# Patient Record
Sex: FEMALE | Race: BLACK OR AFRICAN AMERICAN | NOT HISPANIC OR LATINO | Employment: UNEMPLOYED | URBAN - METROPOLITAN AREA
[De-identification: names, ages, dates, MRNs, and addresses within clinical notes are randomized per-mention and may not be internally consistent; named-entity substitution may affect disease eponyms.]

---

## 2023-08-02 ENCOUNTER — APPOINTMENT (OUTPATIENT)
Dept: LAB | Facility: LAB | Age: 63
End: 2023-08-02
Payer: COMMERCIAL

## 2023-08-02 LAB
ALANINE AMINOTRANSFERASE (SGPT) (U/L) IN SER/PLAS: 19 U/L (ref 7–45)
ALBUMIN (G/DL) IN SER/PLAS: 4.2 G/DL (ref 3.4–5)
ALKALINE PHOSPHATASE (U/L) IN SER/PLAS: 47 U/L (ref 33–136)
ANION GAP IN SER/PLAS: 11 MMOL/L (ref 10–20)
ASPARTATE AMINOTRANSFERASE (SGOT) (U/L) IN SER/PLAS: 26 U/L (ref 9–39)
BILIRUBIN TOTAL (MG/DL) IN SER/PLAS: 0.4 MG/DL (ref 0–1.2)
CALCIUM (MG/DL) IN SER/PLAS: 9.8 MG/DL (ref 8.6–10.6)
CARBON DIOXIDE, TOTAL (MMOL/L) IN SER/PLAS: 30 MMOL/L (ref 21–32)
CHLORIDE (MMOL/L) IN SER/PLAS: 106 MMOL/L (ref 98–107)
CHOLESTEROL (MG/DL) IN SER/PLAS: 173 MG/DL (ref 0–199)
CHOLESTEROL IN HDL (MG/DL) IN SER/PLAS: 73.9 MG/DL
CHOLESTEROL/HDL RATIO: 2.3
CREATININE (MG/DL) IN SER/PLAS: 0.82 MG/DL (ref 0.5–1.05)
ERYTHROCYTE DISTRIBUTION WIDTH (RATIO) BY AUTOMATED COUNT: 15 % (ref 11.5–14.5)
ERYTHROCYTE MEAN CORPUSCULAR HEMOGLOBIN CONCENTRATION (G/DL) BY AUTOMATED: 30.7 G/DL (ref 32–36)
ERYTHROCYTE MEAN CORPUSCULAR VOLUME (FL) BY AUTOMATED COUNT: 91 FL (ref 80–100)
ERYTHROCYTES (10*6/UL) IN BLOOD BY AUTOMATED COUNT: 3.72 X10E12/L (ref 4–5.2)
ESTIMATED AVERAGE GLUCOSE FOR HBA1C: 111 MG/DL
GFR FEMALE: 80 ML/MIN/1.73M2
GLUCOSE (MG/DL) IN SER/PLAS: 96 MG/DL (ref 74–99)
HEMATOCRIT (%) IN BLOOD BY AUTOMATED COUNT: 33.9 % (ref 36–46)
HEMOGLOBIN (G/DL) IN BLOOD: 10.4 G/DL (ref 12–16)
HEMOGLOBIN A1C/HEMOGLOBIN TOTAL IN BLOOD: 5.5 %
LDL: 88 MG/DL (ref 0–99)
LEUKOCYTES (10*3/UL) IN BLOOD BY AUTOMATED COUNT: 3.6 X10E9/L (ref 4.4–11.3)
NRBC (PER 100 WBCS) BY AUTOMATED COUNT: 0 /100 WBC (ref 0–0)
PLATELETS (10*3/UL) IN BLOOD AUTOMATED COUNT: 275 X10E9/L (ref 150–450)
POTASSIUM (MMOL/L) IN SER/PLAS: 4.1 MMOL/L (ref 3.5–5.3)
PROTEIN TOTAL: 6.8 G/DL (ref 6.4–8.2)
SODIUM (MMOL/L) IN SER/PLAS: 143 MMOL/L (ref 136–145)
TRIGLYCERIDE (MG/DL) IN SER/PLAS: 56 MG/DL (ref 0–149)
UREA NITROGEN (MG/DL) IN SER/PLAS: 18 MG/DL (ref 6–23)
VLDL: 11 MG/DL (ref 0–40)

## 2023-08-15 PROBLEM — H04.123 DRY EYE SYNDROME OF LACRIMAL GLAND, BILATERAL: Status: ACTIVE | Noted: 2023-08-15

## 2023-08-15 PROBLEM — S69.90XA INJURY OF LITTLE FINGER: Status: ACTIVE | Noted: 2023-08-15

## 2023-08-15 PROBLEM — H02.88B MEIBOMIAN GLAND DYSFUNCTION (MGD) OF UPPER AND LOWER LIDS OF BOTH EYES: Status: ACTIVE | Noted: 2017-02-15

## 2023-08-15 PROBLEM — E55.9 VITAMIN D DEFICIENCY: Status: ACTIVE | Noted: 2023-05-22

## 2023-08-15 PROBLEM — R07.89 ATYPICAL CHEST PAIN: Status: ACTIVE | Noted: 2023-08-15

## 2023-08-15 PROBLEM — I50.30: Status: ACTIVE | Noted: 2023-08-15

## 2023-08-15 PROBLEM — J34.3 HYPERTROPHY OF NASAL TURBINATES: Status: ACTIVE | Noted: 2023-08-15

## 2023-08-15 PROBLEM — I82.4Y1: Status: ACTIVE | Noted: 2022-12-29

## 2023-08-15 PROBLEM — H27.03: Status: ACTIVE | Noted: 2017-02-15

## 2023-08-15 PROBLEM — M25.511 PAIN IN JOINT OF RIGHT SHOULDER: Status: ACTIVE | Noted: 2023-08-15

## 2023-08-15 PROBLEM — J45.909 ASTHMA (HHS-HCC): Status: ACTIVE | Noted: 2023-08-15

## 2023-08-15 PROBLEM — K92.2 GI BLEED: Status: ACTIVE | Noted: 2023-06-10

## 2023-08-15 PROBLEM — M67.911 DYSFUNCTION OF RIGHT ROTATOR CUFF: Status: ACTIVE | Noted: 2023-08-15

## 2023-08-15 PROBLEM — I70.209 ATHEROSCLEROSIS OF ARTERIES OF EXTREMITIES (CMS-HCC): Status: ACTIVE | Noted: 2023-08-15

## 2023-08-15 PROBLEM — I10 HYPERTENSION: Status: ACTIVE | Noted: 2023-05-22

## 2023-08-15 PROBLEM — J31.0 CHRONIC RHINITIS: Status: ACTIVE | Noted: 2023-08-15

## 2023-08-15 PROBLEM — E78.00 HYPERCHOLESTEROLEMIA: Status: ACTIVE | Noted: 2023-08-15

## 2023-08-15 PROBLEM — N95.0 POST-MENOPAUSAL BLEEDING: Status: ACTIVE | Noted: 2023-08-15

## 2023-08-15 PROBLEM — M81.0 OSTEOPOROSIS WITHOUT CURRENT PATHOLOGICAL FRACTURE: Status: ACTIVE | Noted: 2023-05-22

## 2023-08-15 PROBLEM — R00.2 PALPITATIONS: Status: ACTIVE | Noted: 2023-08-15

## 2023-08-15 PROBLEM — M79.89 LEG SWELLING: Status: ACTIVE | Noted: 2023-08-15

## 2023-08-15 PROBLEM — K62.5 RECTAL BLEEDING: Status: ACTIVE | Noted: 2023-06-10

## 2023-08-15 PROBLEM — F33.42 RECURRENT MAJOR DEPRESSIVE DISORDER, IN FULL REMISSION (CMS-HCC): Status: ACTIVE | Noted: 2023-05-22

## 2023-08-15 PROBLEM — H17.9 CORNEAL SCARS, BOTH EYES: Status: ACTIVE | Noted: 2020-08-13

## 2023-08-15 PROBLEM — M19.90 ARTHRITIS: Status: ACTIVE | Noted: 2023-08-15

## 2023-08-15 PROBLEM — H40.9 GLAUCOMA: Status: ACTIVE | Noted: 2020-08-13

## 2023-08-15 PROBLEM — R05.3 CHRONIC COUGH: Status: ACTIVE | Noted: 2023-08-15

## 2023-08-15 PROBLEM — R00.1 SINUS BRADYCARDIA: Status: ACTIVE | Noted: 2023-08-15

## 2023-08-15 PROBLEM — H40.10X0: Status: ACTIVE | Noted: 2017-02-15

## 2023-08-15 PROBLEM — H02.88A MEIBOMIAN GLAND DYSFUNCTION (MGD) OF UPPER AND LOWER LIDS OF BOTH EYES: Status: ACTIVE | Noted: 2017-02-15

## 2023-08-15 PROBLEM — H27.03 APHAKIA, BILATERAL: Status: ACTIVE | Noted: 2020-08-13

## 2023-08-15 PROBLEM — J38.4 VOCAL CORD EDEMA: Status: ACTIVE | Noted: 2023-08-15

## 2023-08-15 RX ORDER — FLUTICASONE PROPIONATE 50 MCG
1 SPRAY, SUSPENSION (ML) NASAL 2 TIMES DAILY
COMMUNITY
Start: 2016-08-19

## 2023-08-15 RX ORDER — LORATADINE 10 MG/1
10 TABLET ORAL NIGHTLY
COMMUNITY
Start: 2019-02-05

## 2023-08-15 RX ORDER — ESTRADIOL 0.1 MG/G
1 CREAM VAGINAL
COMMUNITY
Start: 2018-02-05

## 2023-08-15 RX ORDER — IBUPROFEN 400 MG/1
TABLET ORAL
COMMUNITY
Start: 2020-09-15

## 2023-08-15 RX ORDER — VIT C/E/ZN/COPPR/LUTEIN/ZEAXAN 250MG-90MG
25 CAPSULE ORAL EVERY MORNING
Qty: 30 CAPSULE | Refills: 11 | COMMUNITY
Start: 2023-07-24 | End: 2024-07-23

## 2023-08-15 RX ORDER — AMLODIPINE BESYLATE 2.5 MG/1
TABLET ORAL
COMMUNITY
Start: 2020-08-18

## 2023-08-15 RX ORDER — ERGOCALCIFEROL 1.25 MG/1
1 CAPSULE ORAL
COMMUNITY
Start: 2018-05-16

## 2023-08-15 RX ORDER — BRIMONIDINE TARTRATE 2 MG/ML
1 SOLUTION/ DROPS OPHTHALMIC
COMMUNITY
Start: 2023-04-07

## 2023-08-15 RX ORDER — DOCUSATE SODIUM 100 MG/1
CAPSULE, LIQUID FILLED ORAL
COMMUNITY
Start: 2015-09-28

## 2023-08-15 RX ORDER — MINERAL OIL, PETROLATUM 425; 573 MG/G; MG/G
OINTMENT OPHTHALMIC
COMMUNITY
Start: 2023-06-08

## 2023-08-15 RX ORDER — HYDROCHLOROTHIAZIDE 25 MG/1
25 TABLET ORAL
COMMUNITY
Start: 2019-03-19

## 2023-08-15 RX ORDER — DICYCLOMINE HYDROCHLORIDE 10 MG/1
1 CAPSULE ORAL 2 TIMES DAILY PRN
COMMUNITY
Start: 2019-03-12

## 2023-08-15 RX ORDER — POLYETHYLENE GLYCOL 3350 17 G/17G
POWDER, FOR SOLUTION ORAL
COMMUNITY
Start: 2015-10-30

## 2023-08-15 RX ORDER — APIXABAN 5 MG (74)
KIT ORAL
COMMUNITY
Start: 2022-12-30

## 2023-08-15 RX ORDER — OXYCODONE AND ACETAMINOPHEN 5; 325 MG/1; MG/1
TABLET ORAL
COMMUNITY
Start: 2022-01-10

## 2023-08-15 RX ORDER — CYCLOBENZAPRINE HCL 10 MG
10 TABLET ORAL NIGHTLY
COMMUNITY
Start: 2020-10-05

## 2023-08-15 RX ORDER — PANTOPRAZOLE SODIUM 20 MG/1
TABLET, DELAYED RELEASE ORAL
COMMUNITY
Start: 2020-08-11

## 2023-08-15 RX ORDER — DICYCLOMINE HYDROCHLORIDE 20 MG/1
1 TABLET ORAL 4 TIMES DAILY PRN
COMMUNITY
Start: 2020-05-01

## 2023-08-15 RX ORDER — METHOCARBAMOL 500 MG/1
TABLET, FILM COATED ORAL
COMMUNITY
Start: 2020-02-18

## 2023-08-15 RX ORDER — MULTIVITAMIN WITH FOLIC ACID 400 MCG
1 TABLET ORAL DAILY
COMMUNITY

## 2023-08-15 RX ORDER — ACYCLOVIR 400 MG/1
400 TABLET ORAL
COMMUNITY
Start: 2019-05-23

## 2023-08-15 RX ORDER — DICLOFENAC SODIUM 75 MG/1
75 TABLET, DELAYED RELEASE ORAL 2 TIMES DAILY PRN
COMMUNITY
Start: 2020-02-24

## 2023-08-15 RX ORDER — LOSARTAN POTASSIUM 25 MG/1
TABLET ORAL
COMMUNITY
Start: 2020-09-15

## 2023-08-15 RX ORDER — HYPROMELLOSE 2910 5 MG/ML
1 SOLUTION OPHTHALMIC
COMMUNITY
Start: 2023-06-07

## 2023-08-15 RX ORDER — NAPROXEN 500 MG/1
1 TABLET ORAL 2 TIMES DAILY PRN
COMMUNITY
Start: 2019-10-22

## 2023-08-15 RX ORDER — KETOROLAC TROMETHAMINE 10 MG/1
1 TABLET, FILM COATED ORAL 2 TIMES DAILY PRN
COMMUNITY
Start: 2021-07-15

## 2023-08-15 RX ORDER — BENZONATATE 100 MG/1
1 CAPSULE ORAL 3 TIMES DAILY PRN
COMMUNITY
Start: 2018-05-16

## 2023-08-15 RX ORDER — CHLORHEXIDINE GLUCONATE ORAL RINSE 1.2 MG/ML
15 SOLUTION DENTAL 2 TIMES DAILY
COMMUNITY
Start: 2015-11-30

## 2023-08-15 RX ORDER — TIMOLOL MALEATE 2.5 MG/ML
1 SOLUTION OPHTHALMIC
COMMUNITY
Start: 2023-06-07

## 2023-08-15 RX ORDER — ATORVASTATIN CALCIUM 40 MG/1
40 TABLET, FILM COATED ORAL
COMMUNITY
Start: 2019-07-29

## 2023-08-15 RX ORDER — CALCIUM CARBONATE 600 MG
1500 TABLET ORAL EVERY MORNING
Qty: 75 TABLET | Refills: 11 | COMMUNITY
Start: 2023-07-24 | End: 2024-07-23

## 2023-08-15 RX ORDER — CLINDAMYCIN PHOSPHATE 10 UG/ML
LOTION TOPICAL
COMMUNITY
Start: 2019-04-29

## 2023-08-15 RX ORDER — DICLOFENAC SODIUM 50 MG/1
1 TABLET, DELAYED RELEASE ORAL
COMMUNITY
Start: 2020-07-27

## 2023-08-15 RX ORDER — CHOLECALCIFEROL (VITAMIN D3) 25 MCG
1000 TABLET ORAL
COMMUNITY
Start: 2020-09-15

## 2023-08-15 RX ORDER — NAPROXEN SODIUM 220 MG/1
81 TABLET, FILM COATED ORAL AS NEEDED
COMMUNITY

## 2023-08-15 RX ORDER — LIDOCAINE HYDROCHLORIDE AND HYDROCORTISONE ACETATE 30; 5 MG/G; MG/G
CREAM TOPICAL EVERY 8 HOURS PRN
COMMUNITY
Start: 2022-11-23

## 2023-08-15 RX ORDER — ACETAMINOPHEN 500 MG
500 TABLET ORAL EVERY 8 HOURS PRN
COMMUNITY
Start: 2020-03-30

## 2023-08-15 RX ORDER — MUPIROCIN 20 MG/G
OINTMENT TOPICAL
COMMUNITY
Start: 2020-02-06

## 2023-08-15 RX ORDER — GUAIFENESIN 100 MG/5ML
100 SOLUTION ORAL EVERY 4 HOURS PRN
COMMUNITY
Start: 2018-12-18

## 2023-08-15 RX ORDER — OMEPRAZOLE 20 MG/1
20 CAPSULE, DELAYED RELEASE ORAL
COMMUNITY
Start: 2015-09-28

## 2023-08-15 RX ORDER — TIZANIDINE 4 MG/1
4 TABLET ORAL 2 TIMES DAILY
COMMUNITY
Start: 2020-07-27

## 2023-08-15 RX ORDER — ACETAMINOPHEN 325 MG/1
325 TABLET ORAL EVERY 6 HOURS PRN
COMMUNITY
Start: 2020-11-16

## 2023-08-15 RX ORDER — LUBIPROSTONE 8 UG/1
1 CAPSULE ORAL
COMMUNITY
Start: 2018-05-16

## 2023-08-15 RX ORDER — TRIAMCINOLONE ACETONIDE 1 MG/G
OINTMENT TOPICAL 2 TIMES DAILY
COMMUNITY
Start: 2019-02-05

## 2023-08-15 RX ORDER — GABAPENTIN 100 MG/1
CAPSULE ORAL
COMMUNITY
Start: 2021-02-24

## 2023-08-15 RX ORDER — CYCLOBENZAPRINE HCL 5 MG
TABLET ORAL
COMMUNITY
Start: 2023-07-24

## 2023-08-15 RX ORDER — DULOXETIN HYDROCHLORIDE 30 MG/1
CAPSULE, DELAYED RELEASE ORAL
COMMUNITY
Start: 2020-05-05

## 2023-08-15 RX ORDER — ORPHENADRINE CITRATE 100 MG/1
TABLET, EXTENDED RELEASE ORAL
COMMUNITY
Start: 2022-04-01

## 2023-08-15 RX ORDER — OLOPATADINE HYDROCHLORIDE 1 MG/ML
1 SOLUTION/ DROPS OPHTHALMIC 2 TIMES DAILY
COMMUNITY
Start: 2023-06-07

## 2023-08-15 RX ORDER — ALBUTEROL SULFATE 0.83 MG/ML
SOLUTION RESPIRATORY (INHALATION)
COMMUNITY
Start: 2021-11-08

## 2023-08-15 RX ORDER — FEXOFENADINE HCL 60 MG
60 TABLET ORAL
COMMUNITY
Start: 2019-04-04

## 2023-08-15 RX ORDER — FAMOTIDINE 20 MG/1
TABLET, FILM COATED ORAL
COMMUNITY
Start: 2022-08-27

## 2023-08-15 RX ORDER — MELOXICAM 15 MG/1
1 TABLET ORAL DAILY PRN
COMMUNITY
Start: 2019-03-19

## 2023-08-15 RX ORDER — IPRATROPIUM BROMIDE 17 UG/1
2 AEROSOL, METERED RESPIRATORY (INHALATION) 4 TIMES DAILY
COMMUNITY
Start: 2019-10-28

## 2023-08-15 RX ORDER — POLYETHYLENE GLYCOL 3350, SODIUM SULFATE ANHYDROUS, SODIUM BICARBONATE, SODIUM CHLORIDE, POTASSIUM CHLORIDE 236; 22.74; 6.74; 5.86; 2.97 G/4L; G/4L; G/4L; G/4L; G/4L
POWDER, FOR SOLUTION ORAL
COMMUNITY
Start: 2023-06-11

## 2023-08-15 RX ORDER — PREDNISOLONE ACETATE 10 MG/ML
SUSPENSION/ DROPS OPHTHALMIC
COMMUNITY
Start: 2023-06-07

## 2023-08-15 RX ORDER — CYCLOSPORINE 0.5 MG/ML
EMULSION OPHTHALMIC
COMMUNITY
Start: 2015-02-16

## 2023-08-15 RX ORDER — SULFACETAMIDE SODIUM, SULFUR 98; 48 MG/G; MG/G
LIQUID TOPICAL
COMMUNITY
Start: 2019-08-16

## 2023-08-15 RX ORDER — MEDROXYPROGESTERONE ACETATE 10 MG/1
TABLET ORAL
COMMUNITY
Start: 2022-09-28

## 2023-08-15 RX ORDER — BACITRACIN 500 [USP'U]/G
OINTMENT TOPICAL
COMMUNITY
Start: 2020-08-20

## 2023-08-15 RX ORDER — IBUPROFEN 600 MG/1
600 TABLET ORAL 2 TIMES DAILY PRN
COMMUNITY
Start: 2019-10-16

## 2023-08-15 RX ORDER — CALCIUM CARBONATE/VITAMIN D3 600MG-5MCG
1 TABLET ORAL
COMMUNITY
Start: 2019-03-10

## 2023-08-15 RX ORDER — SIMVASTATIN 40 MG/1
40 TABLET, FILM COATED ORAL
Qty: 30 TABLET | Refills: 98 | COMMUNITY
Start: 2016-06-24 | End: 2024-07-23

## 2023-08-15 RX ORDER — HYDROCORTISONE 2.5% 25 MG/G
CREAM TOPICAL
COMMUNITY
Start: 2023-01-04

## 2023-08-15 RX ORDER — LISINOPRIL 10 MG/1
10 TABLET ORAL EVERY MORNING
Qty: 30 TABLET | Refills: 7 | COMMUNITY
Start: 2023-01-09 | End: 2023-08-20

## 2023-08-15 RX ORDER — HYDROCORTISONE 25 MG/G
OINTMENT TOPICAL 2 TIMES DAILY
COMMUNITY
Start: 2020-11-20

## 2023-08-15 RX ORDER — CLOTRIMAZOLE 1 %
CREAM (GRAM) TOPICAL
COMMUNITY
Start: 2020-03-24

## 2023-08-15 RX ORDER — OMEPRAZOLE 40 MG/1
40 CAPSULE, DELAYED RELEASE ORAL
COMMUNITY
Start: 2020-04-26 | End: 2024-07-23

## 2023-08-15 RX ORDER — IBUPROFEN 800 MG/1
TABLET ORAL
COMMUNITY
Start: 2020-03-30

## 2023-08-15 RX ORDER — METRONIDAZOLE 7.5 MG/G
LOTION TOPICAL 2 TIMES DAILY
COMMUNITY
Start: 2019-08-16

## 2023-08-15 RX ORDER — BACLOFEN 10 MG/1
TABLET ORAL
COMMUNITY
Start: 2020-05-05

## 2023-08-15 RX ORDER — FLUOXETINE 10 MG/1
10 CAPSULE ORAL EVERY MORNING
Qty: 30 CAPSULE | Refills: 18 | COMMUNITY
Start: 2023-01-10 | End: 2024-07-23

## 2023-08-15 RX ORDER — MINERAL OIL
180 ENEMA (ML) RECTAL
COMMUNITY
Start: 2020-01-22

## 2023-08-15 RX ORDER — AMLODIPINE BESYLATE 5 MG/1
5 TABLET ORAL
COMMUNITY
Start: 2018-04-18

## 2023-08-15 RX ORDER — IBUPROFEN 200 MG
TABLET ORAL
COMMUNITY
Start: 2020-02-18

## 2023-08-15 RX ORDER — TIMOLOL MALEATE 5 MG/ML
1 SOLUTION/ DROPS OPHTHALMIC EVERY MORNING
COMMUNITY
Start: 2023-04-17

## 2023-08-15 RX ORDER — ASPIRIN 81 MG/1
81 TABLET ORAL
COMMUNITY

## 2023-08-15 RX ORDER — TRIAMCINOLONE ACETONIDE 1 MG/G
CREAM TOPICAL 2 TIMES DAILY
COMMUNITY
Start: 2023-07-26

## 2023-08-15 RX ORDER — MULTIVITAMIN
TABLET ORAL
COMMUNITY
Start: 2023-07-24

## 2023-08-15 RX ORDER — MAG HYDROX/ALUMINUM HYD/SIMETH 200-200-20
1 SUSPENSION, ORAL (FINAL DOSE FORM) ORAL 2 TIMES DAILY
COMMUNITY
Start: 2022-06-06

## 2023-08-15 RX ORDER — PANTOPRAZOLE SODIUM 40 MG/1
1 TABLET, DELAYED RELEASE ORAL DAILY
COMMUNITY
Start: 2022-03-10

## 2023-08-15 RX ORDER — FLUOROMETHOLONE 1 MG/ML
SUSPENSION/ DROPS OPHTHALMIC
COMMUNITY
Start: 2023-06-07

## 2023-08-15 RX ORDER — FLUOXETINE HYDROCHLORIDE 40 MG/1
CAPSULE ORAL
COMMUNITY
Start: 2022-10-20

## 2023-08-15 RX ORDER — FLUOXETINE HYDROCHLORIDE 20 MG/1
20 CAPSULE ORAL
COMMUNITY
Start: 2018-03-28

## 2023-08-15 RX ORDER — IPRATROPIUM BROMIDE 42 UG/1
2 SPRAY, METERED NASAL
COMMUNITY
Start: 2017-01-06

## 2023-08-15 RX ORDER — SIMVASTATIN 20 MG/1
TABLET, FILM COATED ORAL
COMMUNITY
Start: 2022-11-07

## 2023-08-15 RX ORDER — ALBUTEROL SULFATE 90 UG/1
2 AEROSOL, METERED RESPIRATORY (INHALATION) EVERY 6 HOURS PRN
COMMUNITY
Start: 2018-04-04

## 2023-08-15 RX ORDER — CALCIUM CARBONATE 500(1250)
1 TABLET ORAL DAILY
COMMUNITY
Start: 2016-06-14

## 2023-08-15 RX ORDER — NALOXONE HYDROCHLORIDE 4 MG/.1ML
SPRAY NASAL
COMMUNITY

## 2023-08-15 RX ORDER — KETOCONAZOLE 20 MG/ML
SHAMPOO, SUSPENSION TOPICAL
COMMUNITY
Start: 2019-02-26

## 2023-08-15 RX ORDER — BIMATOPROST 0.1 MG/ML
1 SOLUTION/ DROPS OPHTHALMIC
COMMUNITY
Start: 2020-08-21

## 2023-08-15 RX ORDER — PREDNISOLONE/GATIFLOX/NEPAFEN 1-0.5-0.1%
1 SUSPENSION, DROPS(FINAL DOSAGE FORM)(ML) OPHTHALMIC (EYE) AS NEEDED
COMMUNITY

## 2023-08-22 PROBLEM — R10.9 ABDOMINAL CRAMPING: Status: ACTIVE | Noted: 2023-08-22

## 2023-08-22 PROBLEM — Z86.718 HISTORY OF DVT (DEEP VEIN THROMBOSIS): Status: ACTIVE | Noted: 2023-08-22

## 2023-08-22 PROBLEM — R49.0 HOARSENESS: Status: ACTIVE | Noted: 2023-08-22

## 2023-08-26 PROBLEM — S93.409A ANKLE SPRAIN: Status: ACTIVE | Noted: 2023-08-26

## 2023-08-26 PROBLEM — I10 ACCELERATED ESSENTIAL HYPERTENSION: Status: ACTIVE | Noted: 2021-04-29

## 2023-08-26 PROBLEM — I83.90 VARICOSE VEIN OF LEG: Status: ACTIVE | Noted: 2023-08-26

## 2023-10-02 ENCOUNTER — CLINICAL SUPPORT (OUTPATIENT)
Dept: AUDIOLOGY | Facility: HOSPITAL | Age: 63
End: 2023-10-02
Payer: COMMERCIAL

## 2023-10-02 DIAGNOSIS — H93.13 TINNITUS, BILATERAL: Primary | ICD-10-CM

## 2023-10-02 PROCEDURE — 92550 TYMPANOMETRY & REFLEX THRESH: CPT

## 2023-10-02 PROCEDURE — 92557 COMPREHENSIVE HEARING TEST: CPT

## 2023-10-02 NOTE — PROGRESS NOTES
"  AUDIOMETRIC EVALUATION    Name:  Fatimah Bullard  :  1960  Age:  63 y.o.  Date of Evaluation:  2023    HISTORY:  Reason for visit: Ms. Bullard is seen today for an evaluation of hearing.     Patient reports constant bilateral tinnitus and having a \"sensitive\" right ear. She denied hearing concerns, dizziness, aural fullness, recent ear infections, otalgia, otorrhea, history of otologic surgeries or history of loud noise exposure.    EVALUATION:    See Audiogram and Immittance results under \"Media\".    RESULTS:     Otoscopic Evaluation:     RIGHT  External ear exam: Normal pinna  Internal ear exam: normal TM and external ear canal right ear     LEFT  External ear exam: Normal pinna  Internal ear exam: normal TM and external ear canal left ear     Immittance:   Immittance Measures: 226 Hz          Right Ear: Type A: Middle ear pressure and eardrm mobility within defined limits         Left Ear:  Type A: Middle ear pressure and eardrm mobility within defined limits    Reflexes and Reflex Decay:    Ipsilateral Reflexes (500-4000 Hz):          Probe/Stimulus Right Ear: present       Probe/Stimulus Left Ear: present    Contralateral Reflexes (500-4000 Hz):      Probe Right Ear (Stimulus Left Ear):  not tested  Probe Left Ear (Stimulus Right Ear):  not tested            Acoustic Reflex Decay (contralateral at 500 and 1000 Hz):       Probe Right Ear (Stimulus Left Ear): not tested  Probe Left Ear (Stimulus Right Ear): not tested    Otoacoustic Emissions [DP(OAEs)]:  Right Ear: Some DPOAEs present: The presence of significant otoacoustic emissions (greater than or equal to 8 dB DP-NF) at some frequencies, while absent at other frequencies when middle ear status is normal suggests abnormal outer hair cell function for only portions of the cochlea. This may be consistent with at least a mild hearing loss at those frequencies where the emissions are absent. 750-8000 Hz  Left Ear: Some DPOAEs present: The " presence of significant otoacoustic emissions (greater than or equal to 8 dB DP-NF) at some frequencies, while absent at other frequencies when middle ear status is normal suggests abnormal outer hair cell function for only portions of the cochlea. This may be consistent with at least a mild hearing loss at those frequencies where the emissions are absent. 750-8000 Hz         Audiometry:  Test Technique: Standard Audiometry under insert phones.    Reliability: good     Pure Tone Audiometry:    Right: Normal hearing sensitivity 125-8000 Hz.    Left: Normal hearing sensitivity 125-8000 Hz.      Speech Audiometry (via recorded, 25-words unless noted; M=masked):       Right Ear: Speech Reception Threshold (SRT) was obtained at 5 dBHL  Word Recognition Scores were Excellent (100%) in quiet when words were presented at 45 dBHL, using the NU-6 3A word list.  Left Ear: Speech Reception Threshold (SRT) was obtained at 5 dBHL  Word Recognition Scores were Excellent (100%) in quiet when words were presented at 45 dBHL, using the NU-6 4A word list.     IMPRESSIONS:  Today's test results suggest normal middle ear functioning with normal hearing sensitivity and excellent word understanding in both ears.     PATIENT EDUCATION:   Ms. Bullard was counseled with regard to the findings.       PLAN:  - Follow up with primary care physician as directed.  - Follow up with Dr. Efe Gutierrez (referring physician) as directed.  - Re-test hearing if concerns arise, or annually to monitor.     Time: 13:12-13:32    TRISTAN Gutierrez, CCC-A  Clinical Audiologist      Degree of   Hearing Sensitivity dB Range   Within Normal Limits (WNL) 0 - 20   Slight 25   Mild 26 - 40   Moderate 41 - 55   Moderately-Severe 56 - 70   Severe 71 - 90   Profound 91 +     KEY  TM Tympanic Membrane   WNL Within Normal Limits   HA Hearing Aid   SNHL Sensorineural Hearing Loss   CHL Conductive Hearing Loss   NIHL Noise-Induced Hearing Loss   ECV Ear Canal Volume

## 2023-10-23 ENCOUNTER — OFFICE VISIT (OUTPATIENT)
Dept: OTOLARYNGOLOGY | Facility: HOSPITAL | Age: 63
End: 2023-10-23
Payer: COMMERCIAL

## 2023-10-23 VITALS — WEIGHT: 124.5 LBS | TEMPERATURE: 97.2 F | BODY MASS INDEX: 22.05 KG/M2

## 2023-10-23 DIAGNOSIS — R49.0 HOARSENESS: ICD-10-CM

## 2023-10-23 DIAGNOSIS — J38.4 VOCAL CORD EDEMA: ICD-10-CM

## 2023-10-23 DIAGNOSIS — J38.1 REINKE'S EDEMA OF VOCAL FOLDS: Primary | ICD-10-CM

## 2023-10-23 DIAGNOSIS — J31.0 CHRONIC RHINITIS: ICD-10-CM

## 2023-10-23 DIAGNOSIS — R49.0 DYSPHONIA: ICD-10-CM

## 2023-10-23 PROCEDURE — 3078F DIAST BP <80 MM HG: CPT | Performed by: OTOLARYNGOLOGY

## 2023-10-23 PROCEDURE — 99214 OFFICE O/P EST MOD 30 MIN: CPT | Performed by: OTOLARYNGOLOGY

## 2023-10-23 PROCEDURE — 31579 LARYNGOSCOPY TELESCOPIC: CPT | Performed by: OTOLARYNGOLOGY

## 2023-10-23 PROCEDURE — 3077F SYST BP >= 140 MM HG: CPT | Performed by: OTOLARYNGOLOGY

## 2023-10-23 PROCEDURE — 31575 DIAGNOSTIC LARYNGOSCOPY: CPT | Performed by: OTOLARYNGOLOGY

## 2023-10-23 PROCEDURE — 3008F BODY MASS INDEX DOCD: CPT | Performed by: OTOLARYNGOLOGY

## 2023-10-23 PROCEDURE — 1036F TOBACCO NON-USER: CPT | Performed by: OTOLARYNGOLOGY

## 2023-10-23 ASSESSMENT — ENCOUNTER SYMPTOMS
OCCASIONAL FEELINGS OF UNSTEADINESS: 0
LOSS OF SENSATION IN FEET: 0

## 2023-10-23 NOTE — PROGRESS NOTES
ASSESSMENT AND PLAN:   Fatimah Bullard is a 63 y.o. 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  She has chronic voice changes but does not wish to pursue any interventions.  We offered endoscopic procedures in the office as well as in the OR.    Today's examination to include stroboscopy/ flexible laryngoscopy demonstrates nodular Bryan's Edema.    We discussed 6-month follow-up versus procedures in the clinic or the OR to remove the nodular changes and improve voice quality.    I would like to see the patient back in 6 months.       Reason For Consult  No chief complaint on file.       HISTORY OF PRESENT ILLNESS:  Fatimah Bullard is a 63 y.o. female presenting for a follow up visit with me for voice changes.  The patient reports no change in voice.  Recently had an audiogram which was normal    Prior History:   Last seen on 04/24/2023 with a history of voice changes that have been on going for several years. SHe was seen at [facility] and noted to have polypoid changes and declined intervention. She was seen by Dr. Beaver for allergies. Today, she has PND with a rough quality to her voice     Exam demonstrated bilateral Bryan's edema with mid cord fullness bilaterally that doesn't appear ominous on examination. There is stiffening of mid cord. She is breathing well without concerns and has dysphagia that are age related     She wished to have no interventions performed despite likely improvement.     Past Medical History  She has a past medical history of Other specified abnormal findings of blood chemistry, Personal history of diseases of the blood and blood-forming organs and certain disorders involving the immune mechanism, Personal history of other diseases of the circulatory system, Personal history of other diseases of the digestive system, Personal history of other diseases of the musculoskeletal system and connective tissue, Personal history of other diseases of the  nervous system and sense organs, Personal history of other diseases of the nervous system and sense organs (10/02/2015), Personal history of other diseases of the respiratory system, Personal history of other endocrine, nutritional and metabolic disease, Unspecified abdominal hernia without obstruction or gangrene, and Unspecified abdominal pain (08/27/2021). Surgical History  She has a past surgical history that includes Tonsillectomy (01/10/2014).   Social History  She has no history on file for tobacco use, alcohol use, and drug use. Allergies  Iodinated contrast media, Penicillins, Prednisone, and Mold     Family History  Family History   Problem Relation Name Age of Onset    Diabetes Other Family hx     Hypertension Other Family hx        Review of Systems  All 10 systems were reviewed and negative except for above.      Last Recorded Vitals  There were no vitals taken for this visit.    Physical Exam  ENT Physical Exam  Constitutional  Appearance: patient appears well-developed and well-nourished,  Head and Face  Appearance: head appears normal and face appears normal;  Ear  Auricles: right auricle normal; left auricle normal;  Nose  External Nose: nares patent bilaterally;  Oral Cavity/Oropharynx  Lips: normal;  Neck  Neck: neck normal; neck palpation normal;  Respiratory  Inspection: no retractions visible;  Cardiovascular  Inspection: no peripheral edema present;  Neurovestibular  Mental Status: alert and oriented;  Psychiatric: mood normal;  Cranial Nerves: cranial nerves intact;        Relevant Results       Patient Reported Outcome Measures         Radiology, Laboratory and Pathology  No results found.      Laryngoscopy    Date/Time: 10/23/2023 3:50 PM    Performed by: Matt Wilks MD  Authorized by: Matt Wilks MD    Consent:     Consent obtained:  Verbal    Consent given by:  Patient  Anesthesia (see MAR for exact dosages):     Anesthesia method:  Topical application  Procedure details:      Indications: assessment of airway and evaluation of larynx and immediate subglottis      Medication:  Afrin    Instrument: flexible fiberoptic laryngoscope      Scope location: bilateral nare    Post-procedure details:     Patient tolerance of procedure:  Tolerated well, no immediate complications     GRBAS: 2, 3, 1, 2, 3  Intelligibility is reduced, resonance is balanced, vocal loudness reduced, breath support is decreased    Subglottic edema is present, thick mucus is present, granuloma is present, ventricular obliteration is complete, erythema is arytenoid, interarytenoid thickening is moderate, vocal fold edema is 4/Bryan's Edema edema, diffuse laryngitis is moderate, gross arytenoid movement is symmetric arytenoid height is normal, supraglottic tension is lateral    She has irregular closure.    Time Spent  Prep time on day of patient encounter: 10 minutes  Time spent directly with patient, family or caregiver: 15 minutes  Additional Time Spent on Patient Care Activities: 5 minutes  Documentation Time: 10 minutes  Other Time Spent: 0 minutes  Total: 40 minutes

## 2024-01-30 ENCOUNTER — APPOINTMENT (OUTPATIENT)
Dept: CARDIOLOGY | Facility: HOSPITAL | Age: 64
End: 2024-01-30
Payer: COMMERCIAL

## 2024-02-07 ENCOUNTER — OFFICE VISIT (OUTPATIENT)
Dept: OBSTETRICS AND GYNECOLOGY | Facility: HOSPITAL | Age: 64
End: 2024-02-07
Payer: COMMERCIAL

## 2024-02-07 VITALS
BODY MASS INDEX: 21.62 KG/M2 | OXYGEN SATURATION: 96 % | HEART RATE: 78 BPM | HEIGHT: 63 IN | WEIGHT: 122 LBS | DIASTOLIC BLOOD PRESSURE: 68 MMHG | RESPIRATION RATE: 12 BRPM | SYSTOLIC BLOOD PRESSURE: 129 MMHG

## 2024-02-07 DIAGNOSIS — Z12.31 ENCOUNTER FOR SCREENING MAMMOGRAM FOR MALIGNANT NEOPLASM OF BREAST: ICD-10-CM

## 2024-02-07 DIAGNOSIS — E28.39 OTHER PRIMARY OVARIAN FAILURE: ICD-10-CM

## 2024-02-07 DIAGNOSIS — M85.80 LOW BONE MASS: Primary | ICD-10-CM

## 2024-02-07 PROCEDURE — 99396 PREV VISIT EST AGE 40-64: CPT | Performed by: OBSTETRICS & GYNECOLOGY

## 2024-02-07 PROCEDURE — 3078F DIAST BP <80 MM HG: CPT | Performed by: OBSTETRICS & GYNECOLOGY

## 2024-02-07 PROCEDURE — 99387 INIT PM E/M NEW PAT 65+ YRS: CPT | Performed by: OBSTETRICS & GYNECOLOGY

## 2024-02-07 PROCEDURE — 3074F SYST BP LT 130 MM HG: CPT | Performed by: OBSTETRICS & GYNECOLOGY

## 2024-02-07 PROCEDURE — 3008F BODY MASS INDEX DOCD: CPT | Performed by: OBSTETRICS & GYNECOLOGY

## 2024-02-07 PROCEDURE — 1036F TOBACCO NON-USER: CPT | Performed by: OBSTETRICS & GYNECOLOGY

## 2024-02-07 SDOH — ECONOMIC STABILITY: FOOD INSECURITY: WITHIN THE PAST 12 MONTHS, THE FOOD YOU BOUGHT JUST DIDN'T LAST AND YOU DIDN'T HAVE MONEY TO GET MORE.: NEVER TRUE

## 2024-02-07 SDOH — ECONOMIC STABILITY: HOUSING INSECURITY
IN THE LAST 12 MONTHS, WAS THERE A TIME WHEN YOU DID NOT HAVE A STEADY PLACE TO SLEEP OR SLEPT IN A SHELTER (INCLUDING NOW)?: NO

## 2024-02-07 SDOH — ECONOMIC STABILITY: INCOME INSECURITY: IN THE LAST 12 MONTHS, WAS THERE A TIME WHEN YOU WERE NOT ABLE TO PAY THE MORTGAGE OR RENT ON TIME?: NO

## 2024-02-07 SDOH — ECONOMIC STABILITY: FOOD INSECURITY: WITHIN THE PAST 12 MONTHS, YOU WORRIED THAT YOUR FOOD WOULD RUN OUT BEFORE YOU GOT MONEY TO BUY MORE.: NEVER TRUE

## 2024-02-07 ASSESSMENT — PAIN SCALES - GENERAL: PAINLEVEL: 0-NO PAIN

## 2024-02-07 ASSESSMENT — LIFESTYLE VARIABLES
SKIP TO QUESTIONS 9-10: 1
AUDIT-C TOTAL SCORE: 0
HOW OFTEN DO YOU HAVE SIX OR MORE DRINKS ON ONE OCCASION: NEVER
HOW MANY STANDARD DRINKS CONTAINING ALCOHOL DO YOU HAVE ON A TYPICAL DAY: PATIENT DOES NOT DRINK
HOW OFTEN DO YOU HAVE A DRINK CONTAINING ALCOHOL: NEVER

## 2024-02-07 ASSESSMENT — ENCOUNTER SYMPTOMS
PSYCHIATRIC NEGATIVE: 0
DEPRESSION: 0
RESPIRATORY NEGATIVE: 0
OCCASIONAL FEELINGS OF UNSTEADINESS: 0
MUSCULOSKELETAL NEGATIVE: 0
GASTROINTESTINAL NEGATIVE: 0
CARDIOVASCULAR NEGATIVE: 0
EYES NEGATIVE: 0
LOSS OF SENSATION IN FEET: 0
NEUROLOGICAL NEGATIVE: 0
CONSTITUTIONAL NEGATIVE: 0
ALLERGIC/IMMUNOLOGIC NEGATIVE: 0
ENDOCRINE NEGATIVE: 0
HEMATOLOGIC/LYMPHATIC NEGATIVE: 0

## 2024-02-07 ASSESSMENT — PATIENT HEALTH QUESTIONNAIRE - PHQ9
SUM OF ALL RESPONSES TO PHQ9 QUESTIONS 1 AND 2: 0
1. LITTLE INTEREST OR PLEASURE IN DOING THINGS: NOT AT ALL
2. FEELING DOWN, DEPRESSED OR HOPELESS: NOT AT ALL

## 2024-02-07 NOTE — PROGRESS NOTES
Subjective   Patient ID: Fatimah Bullard is a 63 y.o. female who presents for Annual Exam.  Pt had hemorrhoids and has some bleeding   LMP age 47   Pt is a poor historian but does not think she has vaginal bleeding   Pt had a normal PAP recently        Review of Systems   All other systems reviewed and are negative.      Objective   Physical Exam  Constitutional:       Appearance: She is normal weight.   Pulmonary:      Effort: Pulmonary effort is normal.   Genitourinary:     General: Normal vulva.      Vagina: Normal.      Cervix: Normal.      Uterus: Normal.       Adnexa: Right adnexa normal and left adnexa normal.      Rectum: Normal.   Musculoskeletal:      Cervical back: Neck supple.      Left foot: Bunion present.   Skin:     General: Skin is warm.   Neurological:      Mental Status: She is alert.   Psychiatric:         Attention and Perception: Attention normal.         Mood and Affect: Mood normal.         Behavior: Behavior normal.         Cognition and Memory: Cognition normal.         Assessment/Plan   Diagnoses and all orders for this visit:  Low bone mass  -     XR DEXA bone density; Future  Encounter for screening mammogram for malignant neoplasm of breast  -     BI mammo bilateral screening tomosynthesis; Future  Other primary ovarian failure  -     XR DEXA bone density; Future    Follow up in 12 months       Adelia Yi MD 02/07/24 2:21 PM

## 2024-02-27 ENCOUNTER — APPOINTMENT (OUTPATIENT)
Dept: CARDIOLOGY | Facility: HOSPITAL | Age: 64
End: 2024-02-27
Payer: COMMERCIAL

## 2024-06-28 ENCOUNTER — LAB (OUTPATIENT)
Dept: LAB | Facility: LAB | Age: 64
End: 2024-06-28
Payer: COMMERCIAL

## 2024-06-28 DIAGNOSIS — D50.8 OTHER IRON DEFICIENCY ANEMIAS: Primary | ICD-10-CM

## 2024-06-28 DIAGNOSIS — D64.9 ANEMIA, UNSPECIFIED: ICD-10-CM

## 2024-06-28 LAB
IRON SATN MFR SERPL: 19 % (ref 25–45)
IRON SERPL-MCNC: 84 UG/DL (ref 35–150)
TIBC SERPL-MCNC: 433 UG/DL (ref 240–445)
UIBC SERPL-MCNC: 349 UG/DL (ref 110–370)
VIT B12 SERPL-MCNC: 439 PG/ML (ref 211–911)

## 2024-06-28 PROCEDURE — 36415 COLL VENOUS BLD VENIPUNCTURE: CPT

## 2024-06-28 PROCEDURE — 82607 VITAMIN B-12: CPT

## 2024-06-28 PROCEDURE — 83550 IRON BINDING TEST: CPT

## 2024-06-28 PROCEDURE — 83540 ASSAY OF IRON: CPT

## 2024-10-22 ENCOUNTER — OFFICE VISIT (OUTPATIENT)
Dept: CARDIOLOGY | Facility: HOSPITAL | Age: 64
End: 2024-10-22
Payer: COMMERCIAL

## 2024-10-22 VITALS
WEIGHT: 125.4 LBS | HEART RATE: 45 BPM | SYSTOLIC BLOOD PRESSURE: 159 MMHG | DIASTOLIC BLOOD PRESSURE: 80 MMHG | BODY MASS INDEX: 22.22 KG/M2 | HEIGHT: 63 IN | OXYGEN SATURATION: 95 %

## 2024-10-22 DIAGNOSIS — R00.1 BRADYCARDIA: ICD-10-CM

## 2024-10-22 DIAGNOSIS — I10 HYPERTENSION, UNSPECIFIED TYPE: ICD-10-CM

## 2024-10-22 DIAGNOSIS — R00.2 PALPITATIONS: Primary | ICD-10-CM

## 2024-10-22 DIAGNOSIS — I15.0 RENOVASCULAR HYPERTENSION: ICD-10-CM

## 2024-10-22 PROCEDURE — 93010 ELECTROCARDIOGRAM REPORT: CPT | Performed by: INTERNAL MEDICINE

## 2024-10-22 PROCEDURE — 3008F BODY MASS INDEX DOCD: CPT | Performed by: INTERNAL MEDICINE

## 2024-10-22 PROCEDURE — 99214 OFFICE O/P EST MOD 30 MIN: CPT | Performed by: INTERNAL MEDICINE

## 2024-10-22 PROCEDURE — 93005 ELECTROCARDIOGRAM TRACING: CPT | Performed by: INTERNAL MEDICINE

## 2024-10-22 PROCEDURE — 1125F AMNT PAIN NOTED PAIN PRSNT: CPT | Performed by: INTERNAL MEDICINE

## 2024-10-22 PROCEDURE — 1159F MED LIST DOCD IN RCRD: CPT | Performed by: INTERNAL MEDICINE

## 2024-10-22 PROCEDURE — 3079F DIAST BP 80-89 MM HG: CPT | Performed by: INTERNAL MEDICINE

## 2024-10-22 PROCEDURE — 3077F SYST BP >= 140 MM HG: CPT | Performed by: INTERNAL MEDICINE

## 2024-10-22 RX ORDER — LISINOPRIL 20 MG/1
20 TABLET ORAL DAILY
Qty: 30 TABLET | Refills: 11 | Status: SHIPPED | OUTPATIENT
Start: 2024-10-22 | End: 2025-10-22

## 2024-10-22 ASSESSMENT — PAIN SCALES - GENERAL: PAINLEVEL_OUTOF10: 2

## 2024-10-22 NOTE — PROGRESS NOTES
"Subjective   Fatimah Bullard is a 64 y.o. female presenting for Cardiology follow-up on a background of HTN, HLD, GERD, DVT and atypical chest pain, COVID recovered.     Investigations:  Heart monitor (4/2021) - VEBs ~ 7000 over 14 day period, occasional bigeminy, 1 triplet. Min 45 bpm.   CACS - 0, bilateral lung infiltrates noted (in context of known recent COVID infection).   CMR (5/8/2021) - Mildly dilated LV with normal systolic function. No evidence of myocarditis, no inducible ischemia or scar.    Chief Complaint:  FUV    HPI  Stable chronic atypical chest pain.   Breathing has improved.   Has had a few mechanical falls due to uneven ground.   No leg swelling, orthopea or PND.  Denies Palpitations   Dizziness resolved a few days ago.   Bradycardic on exam today HR 43 bpm - only AV blocking agent is Timolol eye drops.   CV risk:  HTN - systolic /80  Cr 0.82  LDL 88 mg/dL  HbA1c 5.5%   Non-smoker    ROS  A 10-system review was performed and was unremarkable apart from what is presented in the HPI.     Objective   Physical Exam  Alert and orientated.   Appropriate responses, normal affect.  No respiratory distress at rest.   Skin warm and dry.   Normal radial pulse character and volume. Clinically SR.   Anicteric sclera, no conjunctival pallor.   No JVD or carotid bruits.  Heart sounds dual, no added heart sounds or audible murmurs.   Chest clear on auscultation.   Calves soft, non-tender.  No pedal edema.  ECG - sinus bradycardia 43 bpm, normal conduction intervals.    Lab Review:   Lab Results   Component Value Date     08/02/2023    K 4.1 08/02/2023     08/02/2023    CO2 30 08/02/2023    BUN 18 08/02/2023    CREATININE 0.82 08/02/2023    GLUCOSE 96 08/02/2023    CALCIUM 9.8 08/02/2023     No results found for: \"CKTOTAL\", \"CKMB\", \"CKMBINDEX\", \"TROPONINI\"  Lab Results   Component Value Date    WBC 3.6 (L) 08/02/2023    HGB 10.4 (L) 08/02/2023    HCT 33.9 (L) 08/02/2023    MCV 91 08/02/2023 "     08/02/2023       Assessment/Plan   In summary, Fatimah Bullard is a 64 y.o. female presenting for Cardiology follow-up on a background of HTN, HLD, GERD, DVT and atypical chest pain, COVID recovered.     Asymptomatic bradycardia   - ECG shows sinus bradycardia with normal conduction intervals.   - Timolol eye drops are the only AV blocking agent (needed for Glaucoma management).   - for cardiac monitor to assess degree of bradycardia and assess for arrhythmia.     Systolic hypertension  - increase lisinopril dose to 20mg daily.   - Monitor BP at home.    RTC 6 months.

## 2024-10-22 NOTE — PATIENT INSTRUCTIONS
Thank you for attending the Cardiology clinic at [facility] today. It was nice to meet you.     Your cardiovascular examination showed elevated blood pressure. Your ECG showed a normal heart rhythm but a slow heart rate.     I have increased your lisinopril dose to 20mg daily.     I have ordered a cardiac monitor to assess your heart rate. I will notify you of the results when available.     Ask your eye doctor if there are any therapeutic alternatives to timolol.     I will follow-up with you in clinic in 6-months.

## 2024-10-26 LAB
ATRIAL RATE: 43 BPM
P AXIS: 53 DEGREES
P OFFSET: 214 MS
P ONSET: 146 MS
PR INTERVAL: 160 MS
Q ONSET: 226 MS
QRS COUNT: 7 BEATS
QRS DURATION: 90 MS
QT INTERVAL: 462 MS
QTC CALCULATION(BAZETT): 390 MS
QTC FREDERICIA: 413 MS
R AXIS: 35 DEGREES
T AXIS: -60 DEGREES
T OFFSET: 457 MS
VENTRICULAR RATE: 43 BPM

## 2024-11-12 ENCOUNTER — HOSPITAL ENCOUNTER (OUTPATIENT)
Dept: CARDIOLOGY | Facility: HOSPITAL | Age: 64
Discharge: HOME | End: 2024-11-12
Payer: COMMERCIAL

## 2024-11-12 DIAGNOSIS — R00.1 BRADYCARDIA: ICD-10-CM

## 2024-11-12 PROCEDURE — 93242 EXT ECG>48HR<7D RECORDING: CPT

## 2024-11-16 ENCOUNTER — HOSPITAL ENCOUNTER (EMERGENCY)
Facility: HOSPITAL | Age: 64
Discharge: HOME | End: 2024-11-16
Attending: EMERGENCY MEDICINE
Payer: COMMERCIAL

## 2024-11-16 VITALS
OXYGEN SATURATION: 98 % | RESPIRATION RATE: 16 BRPM | HEIGHT: 63 IN | DIASTOLIC BLOOD PRESSURE: 95 MMHG | BODY MASS INDEX: 22.5 KG/M2 | WEIGHT: 127 LBS | TEMPERATURE: 98.4 F | HEART RATE: 56 BPM | SYSTOLIC BLOOD PRESSURE: 180 MMHG

## 2024-11-16 DIAGNOSIS — N61.0 MASTITIS: Primary | ICD-10-CM

## 2024-11-16 PROCEDURE — 99284 EMERGENCY DEPT VISIT MOD MDM: CPT | Performed by: EMERGENCY MEDICINE

## 2024-11-16 PROCEDURE — 99283 EMERGENCY DEPT VISIT LOW MDM: CPT

## 2024-11-16 RX ORDER — DOXYCYCLINE 100 MG/1
100 CAPSULE ORAL 2 TIMES DAILY
Qty: 14 CAPSULE | Refills: 0 | Status: SHIPPED | OUTPATIENT
Start: 2024-11-16 | End: 2024-11-23

## 2024-11-16 ASSESSMENT — COLUMBIA-SUICIDE SEVERITY RATING SCALE - C-SSRS
6. HAVE YOU EVER DONE ANYTHING, STARTED TO DO ANYTHING, OR PREPARED TO DO ANYTHING TO END YOUR LIFE?: NO
2. HAVE YOU ACTUALLY HAD ANY THOUGHTS OF KILLING YOURSELF?: NO
1. IN THE PAST MONTH, HAVE YOU WISHED YOU WERE DEAD OR WISHED YOU COULD GO TO SLEEP AND NOT WAKE UP?: NO

## 2024-11-16 ASSESSMENT — PAIN - FUNCTIONAL ASSESSMENT: PAIN_FUNCTIONAL_ASSESSMENT: 0-10

## 2024-11-16 ASSESSMENT — PAIN SCALES - GENERAL: PAINLEVEL_OUTOF10: 0 - NO PAIN

## 2024-11-16 NOTE — ED TRIAGE NOTES
Patient with discharge coming from the left breast. She states that this has been ongoing for 2 days.  She has a halter monitor in place that was given to her for evaluation of bradycardia, and she is being followed by cardiology.  She denies any injury to the breast to cause this to occur, and states that the only thing different is that she had the halter placed a few days ago as well.

## 2024-11-16 NOTE — ED PROVIDER NOTES
Emergency Department Provider Note        History of Present Illness     History provided by: Patient  Limitations to History: None  External Records Reviewed with Brief Summary: None    HPI:  Fatimah Bullard is a 64 y.o. female with past medical history of hypertension, bradycardia presenting today for left breast discharge.  Patient endorses some soreness and lump in that left breast.  She states symptoms started a couple of days ago, and notes she has never had drainage before.  She states the drainage is a dark blackish color.  She denies any tenderness but does endorse some soreness.  Patient states she has had multiple lumps requiring biopsies of all been negative.  She states she is scheduled for a mammogram in 5 days.  She denies any fevers, night sweats, weight loss.    Physical Exam   Triage vitals:  T 36.9 °C (98.4 °F)  HR 56  BP (!) 180/95  RR 16  O2 98 % None (Room air)    General: Awake, alert, in no acute distress  Eyes: Gaze conjugate.  No scleral icterus or injection  HENT: Normo-cephalic, atraumatic. No stridor  CV: Regular rate, regular rhythm.   Resp: Breathing non-labored, speaking in full sentences.   GI: Soft, non-distended, non-tender. No rebound or guarding.  MSK/Extremities: No gross bony deformities. Moving all extremities  Skin: Warm. Appropriate color.  Minimal erythema noted to the left areola, no palpable mass felt at the left breast or left axilla.  A very small amount of dark drainage is noted coming from the left nipple area.  Neuro: Alert. Oriented. Face symmetric. Speech is fluent.  Gross strength and sensation intact in b/l UE and LEs  Psych: Appropriate mood and affect    Medical Decision Making & ED Course   Medical Decision Makin y.o. female is a 64 y.o. female with past medical history of hypertension, bradycardia presenting today for left breast discharge.  Upon initial evaluation patient is well-appearing with reassuring vital signs.  Physical exam remarkable  for a very small amount of drainage from the left breast, but no palpable mass of the left breast or axilla.  Differential diagnosis includes mastitis, breast cancer, abscess, benign breast mass.  Point-of-care ultrasound was performed of the left breast which revealed no obvious fluid collection or abscess.  Patient was comfortable being discharged with follow-up for her scheduled mammogram on the 21st.  Patient prescribed a course of Augmentin for potential infection of the left breast.  Patient was recommended to continue following up with her gynecologist at [facility], patient was provided additional gynecology referral through .  Patient discharged home in stable condition and given appropriate return precautions.  ----      Differential diagnoses considered include but are not limited to: mastitis, breast cancer, abscess, benign breast mass.     Social Determinants of Health which Significantly Impact Care: None identified     EKG Independent Interpretation: EKG not obtained    Independent Result Review and Interpretation:  Point-of-care ultrasound  was performed of the left breast which revealed no obvious fluid collection or abscess    Chronic conditions affecting the patient's care: As documented above in [facility]    The patient was discussed with the following consultants/services: None    Care Considerations: As documented above in [facility]    ED Course:  Diagnoses as of 11/16/24 1803   Mastitis     Disposition   As a result of the work-up, the patient was discharged home.  she was informed of her diagnosis and instructed to come back with any concerns or worsening of condition.  she and was agreeable to the plan as discussed above.  she was given the opportunity to ask questions.  All of the patient's questions were answered.    Procedures   Procedures    Patient seen and discussed with ED attending physician.    Chester Johnson DO  Emergency Medicine       Chester Johnson DO  Resident  11/16/24 1216

## 2025-02-04 ENCOUNTER — APPOINTMENT (OUTPATIENT)
Dept: CARDIOLOGY | Facility: HOSPITAL | Age: 65
End: 2025-02-04
Payer: COMMERCIAL

## 2025-03-12 RX ORDER — DORZOLAMIDE HCL 20 MG/ML
1 SOLUTION/ DROPS OPHTHALMIC 2 TIMES DAILY
COMMUNITY
Start: 2024-12-17

## 2025-03-12 RX ORDER — CETIRIZINE HYDROCHLORIDE 10 MG/1
10 TABLET ORAL
COMMUNITY
Start: 2024-09-20

## 2025-03-12 RX ORDER — FERROUS SULFATE 325(65) MG
325 TABLET ORAL EVERY OTHER DAY
COMMUNITY
Start: 2025-03-05

## 2025-03-13 ENCOUNTER — OFFICE VISIT (OUTPATIENT)
Dept: CARDIOLOGY | Facility: HOSPITAL | Age: 65
End: 2025-03-13
Payer: COMMERCIAL

## 2025-03-13 VITALS
OXYGEN SATURATION: 95 % | BODY MASS INDEX: 21.97 KG/M2 | DIASTOLIC BLOOD PRESSURE: 79 MMHG | WEIGHT: 124 LBS | HEIGHT: 63 IN | SYSTOLIC BLOOD PRESSURE: 189 MMHG | HEART RATE: 72 BPM

## 2025-03-13 DIAGNOSIS — E78.5 HYPERLIPIDEMIA, UNSPECIFIED HYPERLIPIDEMIA TYPE: ICD-10-CM

## 2025-03-13 DIAGNOSIS — I10 HYPERTENSION, UNSPECIFIED TYPE: Primary | ICD-10-CM

## 2025-03-13 PROCEDURE — 1159F MED LIST DOCD IN RCRD: CPT | Performed by: INTERNAL MEDICINE

## 2025-03-13 PROCEDURE — 99214 OFFICE O/P EST MOD 30 MIN: CPT | Performed by: INTERNAL MEDICINE

## 2025-03-13 PROCEDURE — 3008F BODY MASS INDEX DOCD: CPT | Performed by: INTERNAL MEDICINE

## 2025-03-13 PROCEDURE — 3077F SYST BP >= 140 MM HG: CPT | Performed by: INTERNAL MEDICINE

## 2025-03-13 PROCEDURE — 1126F AMNT PAIN NOTED NONE PRSNT: CPT | Performed by: INTERNAL MEDICINE

## 2025-03-13 PROCEDURE — 85027 COMPLETE CBC AUTOMATED: CPT | Performed by: INTERNAL MEDICINE

## 2025-03-13 PROCEDURE — 3078F DIAST BP <80 MM HG: CPT | Performed by: INTERNAL MEDICINE

## 2025-03-13 PROCEDURE — 1160F RVW MEDS BY RX/DR IN RCRD: CPT | Performed by: INTERNAL MEDICINE

## 2025-03-13 PROCEDURE — 80053 COMPREHEN METABOLIC PANEL: CPT | Performed by: INTERNAL MEDICINE

## 2025-03-13 PROCEDURE — G2211 COMPLEX E/M VISIT ADD ON: HCPCS | Performed by: INTERNAL MEDICINE

## 2025-03-13 ASSESSMENT — PAIN SCALES - GENERAL: PAINLEVEL_OUTOF10: 0-NO PAIN

## 2025-03-13 ASSESSMENT — PATIENT HEALTH QUESTIONNAIRE - PHQ9
2. FEELING DOWN, DEPRESSED OR HOPELESS: NOT AT ALL
SUM OF ALL RESPONSES TO PHQ9 QUESTIONS 1 AND 2: 0
1. LITTLE INTEREST OR PLEASURE IN DOING THINGS: NOT AT ALL

## 2025-03-13 ASSESSMENT — ENCOUNTER SYMPTOMS
OCCASIONAL FEELINGS OF UNSTEADINESS: 0
DEPRESSION: 0
LOSS OF SENSATION IN FEET: 0

## 2025-03-13 ASSESSMENT — COLUMBIA-SUICIDE SEVERITY RATING SCALE - C-SSRS
1. IN THE PAST MONTH, HAVE YOU WISHED YOU WERE DEAD OR WISHED YOU COULD GO TO SLEEP AND NOT WAKE UP?: NO
2. HAVE YOU ACTUALLY HAD ANY THOUGHTS OF KILLING YOURSELF?: NO
6. HAVE YOU EVER DONE ANYTHING, STARTED TO DO ANYTHING, OR PREPARED TO DO ANYTHING TO END YOUR LIFE?: NO

## 2025-03-13 NOTE — PROGRESS NOTES
Subjective   Fatimah Bullard is a 65 y.o. female presenting for Cardiology follow-up on a background of HTN, HLD, GERD, DVT and atypical chest pain, COVID recovered.     Investigations:  Cardiac monitor (12/2024)   * The predominant rhythm was Sinus. * The Maximum Heart Rate recorded was 179 bpm, 11/ 17 14: 12: 35, the Minimum Heart Rate recorded was 39 bpm, 11/ 16 01: 53: 36, and the Average Heart Rate was 66 bpm. * There were 449 VE beats with a burden of < 1 % . * There were 248 SVE beats with a burden of < 1 % . There were 2 occurrences of Supraventricular Tachycardia with the Fastest episode 179 bpm, 11/ 17 14: 12: 32, and the Longest episode 12 beats, 11/ 17 14: 12: 32. * There were 28 Patient Triggers correlating with sinus rhythm.    Heart monitor (4/2021) - VEBs ~ 7000 over 14 day period, occasional bigeminy, 1 triplet. Min 45 bpm.   CACS - 0, bilateral lung infiltrates noted (in context of known recent COVID infection).   CMR (5/8/2021) - Mildly dilated LV with normal systolic function. No evidence of myocarditis, no inducible ischemia or scar.    Chief Complaint:  Hypertension, cardiology follow-up    HPI  Asymptomatic.   Completed cardiac monitor - no significant episodes of bradycardia, average HR 66 bpm. Two episodes of SVT, longest 12 beats.  HR in clinic today 72 bpm. Hypertensive however has not taken prescribed lisinopril today.     CV risk:  HTN - systolic HTN, has not taken medications.   Cr 0.82  LDL 88 mg/dL  HbA1c 5.5%   Non-smoker    ROS  A 10-system review was performed and was unremarkable apart from what is presented in the HPI.     Objective   Physical Exam  Alert and orientated.   Appropriate responses, normal affect.  No respiratory distress at rest.   Skin warm and dry.   Normal radial pulse character and volume. Clinically SR.   Anicteric sclera, no conjunctival pallor.   No JVD or carotid bruits.  Heart sounds dual, no added heart sounds or audible murmurs.   Chest clear on  "auscultation.   Calves soft, non-tender.  No pedal edema.    Lab Review:   Lab Results   Component Value Date     08/02/2023    K 4.1 08/02/2023     08/02/2023    CO2 30 08/02/2023    BUN 18 08/02/2023    CREATININE 0.82 08/02/2023    GLUCOSE 96 08/02/2023    CALCIUM 9.8 08/02/2023     No results found for: \"CKTOTAL\", \"CKMB\", \"CKMBINDEX\", \"TROPONINI\"  Lab Results   Component Value Date    WBC 3.6 (L) 08/02/2023    HGB 10.4 (L) 08/02/2023    HCT 33.9 (L) 08/02/2023    MCV 91 08/02/2023     08/02/2023       Assessment/Plan   In summary, Fatimah Bullard is a 64 y.o. female presenting for Cardiology follow-up on a background of HTN, HLD, GERD, DVT and atypical chest pain, COVID recovered.     Bradycardia   No significant bradycardia on monitor, brief non-sustained episodes of SVT. No Rx for now.     2. Hypertension  - Elevated BP in clinic today  - Advised to take lisinopril as prescribed  - monitor and record BP at home     3. Diabetes screen    RTC 12-months  "

## 2025-03-13 NOTE — PATIENT INSTRUCTIONS
Thank you for attending the Cardiology clinic at [facility] today.     Your cardiovascular examination was notable for elevated blood pressure.    Your recent heart monitor showed a normal average heart rate and there were no significant pauses.     Take your lisinopril daily and record your blood pressure 3 hours after taking the medication. Notify my office if your blood pressure remains above 140 mmHg.    Please have (fasting) blood collected for lab work.     I will follow-up with you in clinic in 12-months.

## 2025-03-14 ENCOUNTER — APPOINTMENT (OUTPATIENT)
Dept: LAB | Facility: HOSPITAL | Age: 65
End: 2025-03-14
Payer: COMMERCIAL

## 2025-03-14 LAB
ALBUMIN SERPL BCP-MCNC: 4.6 G/DL (ref 3.4–5)
ALP SERPL-CCNC: 51 U/L (ref 33–136)
ALT SERPL W P-5'-P-CCNC: 24 U/L (ref 7–45)
ANION GAP SERPL CALC-SCNC: 12 MMOL/L (ref 10–20)
AST SERPL W P-5'-P-CCNC: 30 U/L (ref 9–39)
BILIRUB SERPL-MCNC: 0.4 MG/DL (ref 0–1.2)
BUN SERPL-MCNC: 18 MG/DL (ref 6–23)
CALCIUM SERPL-MCNC: 9.9 MG/DL (ref 8.6–10.3)
CHLORIDE SERPL-SCNC: 106 MMOL/L (ref 98–107)
CO2 SERPL-SCNC: 28 MMOL/L (ref 21–32)
CREAT SERPL-MCNC: 0.94 MG/DL (ref 0.5–1.05)
EGFRCR SERPLBLD CKD-EPI 2021: 67 ML/MIN/1.73M*2
ERYTHROCYTE [DISTWIDTH] IN BLOOD BY AUTOMATED COUNT: 14.7 % (ref 11.5–14.5)
GLUCOSE SERPL-MCNC: 91 MG/DL (ref 74–99)
HCT VFR BLD AUTO: 35.8 % (ref 36–46)
HGB BLD-MCNC: 11 G/DL (ref 12–16)
MCH RBC QN AUTO: 27.4 PG (ref 26–34)
MCHC RBC AUTO-ENTMCNC: 30.7 G/DL (ref 32–36)
MCV RBC AUTO: 89 FL (ref 80–100)
NRBC BLD-RTO: 0 /100 WBCS (ref 0–0)
PLATELET # BLD AUTO: 303 X10*3/UL (ref 150–450)
POTASSIUM SERPL-SCNC: 4.1 MMOL/L (ref 3.5–5.3)
PROT SERPL-MCNC: 7.6 G/DL (ref 6.4–8.2)
RBC # BLD AUTO: 4.01 X10*6/UL (ref 4–5.2)
SODIUM SERPL-SCNC: 142 MMOL/L (ref 136–145)
WBC # BLD AUTO: 3.7 X10*3/UL (ref 4.4–11.3)

## 2025-03-18 ENCOUNTER — APPOINTMENT (OUTPATIENT)
Dept: CARDIOLOGY | Facility: HOSPITAL | Age: 65
End: 2025-03-18
Payer: COMMERCIAL

## 2025-04-08 ENCOUNTER — APPOINTMENT (OUTPATIENT)
Dept: CARDIOLOGY | Facility: HOSPITAL | Age: 65
End: 2025-04-08
Payer: COMMERCIAL

## 2025-04-22 ENCOUNTER — APPOINTMENT (OUTPATIENT)
Dept: CARDIOLOGY | Facility: HOSPITAL | Age: 65
End: 2025-04-22
Payer: COMMERCIAL

## 2025-05-06 ENCOUNTER — OFFICE VISIT (OUTPATIENT)
Dept: CARDIOLOGY | Facility: HOSPITAL | Age: 65
End: 2025-05-06
Payer: COMMERCIAL

## 2025-05-06 VITALS
SYSTOLIC BLOOD PRESSURE: 127 MMHG | OXYGEN SATURATION: 100 % | HEIGHT: 63 IN | BODY MASS INDEX: 22.01 KG/M2 | HEART RATE: 55 BPM | DIASTOLIC BLOOD PRESSURE: 74 MMHG | WEIGHT: 124.2 LBS

## 2025-05-06 DIAGNOSIS — I10 ESSENTIAL HYPERTENSION: Primary | ICD-10-CM

## 2025-05-06 PROCEDURE — 99212 OFFICE O/P EST SF 10 MIN: CPT | Performed by: INTERNAL MEDICINE

## 2025-05-06 PROCEDURE — G2211 COMPLEX E/M VISIT ADD ON: HCPCS | Performed by: INTERNAL MEDICINE

## 2025-05-06 PROCEDURE — 3074F SYST BP LT 130 MM HG: CPT | Performed by: INTERNAL MEDICINE

## 2025-05-06 PROCEDURE — 3078F DIAST BP <80 MM HG: CPT | Performed by: INTERNAL MEDICINE

## 2025-05-06 PROCEDURE — 3008F BODY MASS INDEX DOCD: CPT | Performed by: INTERNAL MEDICINE

## 2025-05-06 PROCEDURE — 99213 OFFICE O/P EST LOW 20 MIN: CPT | Performed by: INTERNAL MEDICINE

## 2025-05-06 PROCEDURE — 1160F RVW MEDS BY RX/DR IN RCRD: CPT | Performed by: INTERNAL MEDICINE

## 2025-05-06 PROCEDURE — 1125F AMNT PAIN NOTED PAIN PRSNT: CPT | Performed by: INTERNAL MEDICINE

## 2025-05-06 PROCEDURE — 1159F MED LIST DOCD IN RCRD: CPT | Performed by: INTERNAL MEDICINE

## 2025-05-06 ASSESSMENT — COLUMBIA-SUICIDE SEVERITY RATING SCALE - C-SSRS
6. HAVE YOU EVER DONE ANYTHING, STARTED TO DO ANYTHING, OR PREPARED TO DO ANYTHING TO END YOUR LIFE?: NO
1. IN THE PAST MONTH, HAVE YOU WISHED YOU WERE DEAD OR WISHED YOU COULD GO TO SLEEP AND NOT WAKE UP?: NO
2. HAVE YOU ACTUALLY HAD ANY THOUGHTS OF KILLING YOURSELF?: NO

## 2025-05-06 ASSESSMENT — ENCOUNTER SYMPTOMS: DEPRESSION: 0

## 2025-05-06 ASSESSMENT — PAIN SCALES - GENERAL: PAINLEVEL_OUTOF10: 8

## 2025-05-06 NOTE — PROGRESS NOTES
Subjective   Fatimah Bullard is a 65 y.o. female presenting for Cardiology follow-up on a background of HTN, HLD, GERD, DVT and atypical chest pain, COVID recovered.     Investigations:  Cardiac monitor (12/2024)   * The predominant rhythm was Sinus. * The Maximum Heart Rate recorded was 179 bpm, 11/ 17 14: 12: 35, the Minimum Heart Rate recorded was 39 bpm, 11/ 16 01: 53: 36, and the Average Heart Rate was 66 bpm. * There were 449 VE beats with a burden of < 1 % . * There were 248 SVE beats with a burden of < 1 % . There were 2 occurrences of Supraventricular Tachycardia with the Fastest episode 179 bpm, 11/ 17 14: 12: 32, and the Longest episode 12 beats, 11/ 17 14: 12: 32. * There were 28 Patient Triggers correlating with sinus rhythm.    Heart monitor (4/2021) - VEBs ~ 7000 over 14 day period, occasional bigeminy, 1 triplet. Min 45 bpm.   CACS - 0, bilateral lung infiltrates noted (in context of known recent COVID infection).   CMR (5/8/2021) - Mildly dilated LV with normal systolic function. No evidence of myocarditis, no inducible ischemia or scar.    Chief Complaint:  Hypertension, cardiology follow-up    HPI  Due to have hysteroscopy with biopsy next week.  No shortness of breath.   Atypical chest pain - chronic.  No leg swelling, no presyncope or syncope.     CV risk:  HTN - systolic HTN, has not taken medications.   Cr 0.82  LDL 88 mg/dL  HbA1c 5.5%   Non-smoker    ROS  A 10-system review was performed and was unremarkable apart from what is presented in the HPI.     Objective   Physical Exam  Alert and orientated.   Appropriate responses, normal affect.  No respiratory distress at rest.   Skin warm and dry.   Normal radial pulse character and volume. Clinically SR.   Anicteric sclera, no conjunctival pallor.   No JVD or carotid bruits.  Heart sounds dual, no added heart sounds or audible murmurs.   Chest clear on auscultation.   Calves soft, non-tender.  No pedal edema.    Lab Review:   Lab Results  "  Component Value Date     03/14/2025    K 4.1 03/14/2025     03/14/2025    CO2 28 03/14/2025    BUN 18 03/14/2025    CREATININE 0.94 03/14/2025    GLUCOSE 91 03/14/2025    CALCIUM 9.9 03/14/2025     No results found for: \"CKTOTAL\", \"CKMB\", \"CKMBINDEX\", \"TROPONINI\"  Lab Results   Component Value Date    WBC 3.7 (L) 03/14/2025    HGB 11.0 (L) 03/14/2025    HCT 35.8 (L) 03/14/2025    MCV 89 03/14/2025     03/14/2025       Assessment/Plan   In summary, Fatimah Bullard is a 64 y.o. female presenting for Cardiology follow-up on a background of HTN, HLD, GERD, DVT and atypical chest pain, COVID recovered.  She is currently asymptomatic from a cardiac viewpoint.  Her cardiovascular examination today was unremarkable and her ECG showed SR with non-specific lateral T-wave changes that are stable from previous. The following management issues were addressed in clinic today:    1. Hypertension  - well controlled on current therapy, continue    2. Pre-procedure  - low-risk from cardiac viewpoint. May proceed, no additional cardiac testing needed at this point.   - EKG - SR.     RTC 12-months  "

## 2025-05-06 NOTE — PATIENT INSTRUCTIONS
Thank you for attending the Cardiology clinic at [facility] today.    Your cardiovascular examination was within normal limits. Your blood pressure control has improved significantly. Your ECG showed a normal heart rhythm.    Continue your current medications.     We will arrange cardiology follow-up for you in 12-months.

## 2025-06-15 ENCOUNTER — HOSPITAL ENCOUNTER (EMERGENCY)
Facility: HOSPITAL | Age: 65
Discharge: HOME | End: 2025-06-15
Attending: EMERGENCY MEDICINE
Payer: COMMERCIAL

## 2025-06-15 VITALS
SYSTOLIC BLOOD PRESSURE: 158 MMHG | DIASTOLIC BLOOD PRESSURE: 89 MMHG | HEART RATE: 78 BPM | OXYGEN SATURATION: 99 % | TEMPERATURE: 97.2 F | RESPIRATION RATE: 16 BRPM

## 2025-06-15 DIAGNOSIS — S81.801A LEG WOUND, RIGHT, INITIAL ENCOUNTER: Primary | ICD-10-CM

## 2025-06-15 DIAGNOSIS — I82.501 CHRONIC DEEP VEIN THROMBOSIS (DVT) OF RIGHT LOWER EXTREMITY, UNSPECIFIED VEIN: ICD-10-CM

## 2025-06-15 PROCEDURE — 99284 EMERGENCY DEPT VISIT MOD MDM: CPT | Performed by: EMERGENCY MEDICINE

## 2025-06-15 PROCEDURE — 99283 EMERGENCY DEPT VISIT LOW MDM: CPT | Performed by: EMERGENCY MEDICINE

## 2025-06-15 RX ORDER — BACITRACIN ZINC 500 UNIT/G
OINTMENT IN PACKET (EA) TOPICAL ONCE
Status: DISCONTINUED | OUTPATIENT
Start: 2025-06-15 | End: 2025-06-15 | Stop reason: HOSPADM

## 2025-06-15 RX ORDER — TRAMADOL HYDROCHLORIDE 50 MG/1
50 TABLET, FILM COATED ORAL EVERY 6 HOURS PRN
Qty: 12 TABLET | Refills: 0 | Status: SHIPPED | OUTPATIENT
Start: 2025-06-15 | End: 2025-06-18

## 2025-06-15 NOTE — DISCHARGE INSTRUCTIONS
Please be sure to follow-up closely with your primary provider for continued treatment of your known DVT

## 2025-06-18 NOTE — ED PROVIDER NOTES
Emergency Department Provider Note        History of Present Illness     CC: Wound Check     HPI:  This is a 65-year-old female with a past medical history of hypertension, hyperlipidemia, GERD, DVT on Eliquis, ulcerative colitis, glaucoma, depression who presents to the emergency department for evaluation of right lower extremity wound and possible DVT.  Patient states that she had presented to an urgent care within the past couple of days.  Was being evaluated for a right lower extremity wound and swelling.  States that she had used a very tight compression bandage which caused an abrasion on her skin.  She reports chronic skin changes including hyperpigmentation versus ecchymosis to the lateral aspect of the right distal leg.  She does state that she has a known DVT in the right lower extremity as well.  Reports compliance on her Eliquis.  At the urgent care, she was ordered and obtained an ultrasound of the right lower extremity which again revealed a DVT.  She states that she was told to present to the emergency department for evaluation.  She denies any significant pain.  Acknowledges the abrasion that still exists however declines any difficulty ambulating, significant swelling or pain.  Denies any fevers or chills.  Otherwise is doing well and has been in her normal state of health.    Limitations to history: None  Independent historian(s): None   Records Reviewed: Recent available ED and inpatient notes reviewed in EMR.  Urgent care visit from 6/13 with a telephone encounter.  I also reviewed the DVT scan results.    PMHx/PSHx:  Per HPI.   - has a past medical history of Other specified abnormal findings of blood chemistry, Personal history of diseases of the blood and blood-forming organs and certain disorders involving the immune mechanism, Personal history of other diseases of the circulatory system, Personal history of other diseases of the digestive system, Personal history of other diseases of the  musculoskeletal system and connective tissue, Personal history of other diseases of the nervous system and sense organs, Personal history of other diseases of the nervous system and sense organs (10/02/2015), Personal history of other diseases of the respiratory system, Personal history of other endocrine, nutritional and metabolic disease, Unspecified abdominal hernia without obstruction or gangrene, and Unspecified abdominal pain (08/27/2021).  - has a past surgical history that includes Tonsillectomy (01/10/2014).    Medications:  Reviewed in EMR. See EMR for complete list of medications and doses.    Allergies:  Iodinated contrast media, Penicillins, Prednisone, and Mold    Social History:  - Tobacco:  reports that she has quit smoking. Her smoking use included cigarettes. She has never used smokeless tobacco.   - Alcohol:  reports that she does not currently use alcohol.   - Illicit Drugs:  reports no history of drug use.     ROS:  Per HPI.       Physical Exam     Triage Vitals:  T 36.2 °C (97.2 °F)  HR 78  /89  RR 16  O2 99 %      General: Patient resting comfortably, no acute distress, overall well appearing, and appropriately conversational.   Head: Normocephalic. Atraumatic.  Neck: FROM. No gross masses.   Eyes: EOMI. Conjunctiva clear.   ENT: Moist mucous membranes, no apparent trauma or lesions.  CV: Regular rate. 2+ radial pulses bilaterally.  Resp: No respiratory distress, breathing easily. Speaks in full sentences.    GI: Soft, non-distended. No tenderness with palpation.   EXT: Right lower extremity with hyperpigmentation to the lateral aspect of the lower leg.  There is 2 very small superficial abrasions near the lateral malleolus and distal fibula area.  No significant swelling appreciated.  No tenderness on palpation.  Skin: Warm and dry, no rashes or lesions.  Neuro: Alert. No focal neurological deficits. Motor and sensation intact throughout. Speech fluent. Normal gait.   Psych:  "Appropriate mood and behavior, converses and responds appropriately.      Medical Decision Making & ED Course     Labs:   Labs Reviewed - No data to display     Imaging:   No orders to display        EKG:  None    MDM:  This is a 65-year-old female who presents to the emergency department for evaluation of a possible right lower extremity DVT.  She is hemodynamically stable and in no significant distress.  She is very well-appearing.  Her chart was reviewed.  Presented to an urgent care couple of days ago and had a DVT ultrasound of the right lower extremity obtained.  This showed a proximal DVT in the right lower extremity not clearly changed from 2023 representing chronic thrombosis.  Did state that new thrombus cannot be excluded although considered less likely.  A telephone encounter was then reviewed in which the urgent care provider recommended the patient come in to the emergency department for \"additional evaluation, advanced imaging/blood work and a higher level of care.\"  There are no acute concerns on the patient's exam.  We thoroughly discussed the patient's ultrasound results.  There is no further imaging to be performed as ultrasound is definitive for DVT diagnosis.  We offered additional ultrasound however had dania discussion that it may not be conclusive in providing additional information.  Patient is currently on the recommended treatment and has been stable on this for a couple of years.  There is no additional blood work that is needed to be obtained to diagnose a DVT.  With shared decision making we will defer further workup at this time.  Patient will follow-up with her primary doctor regarding management of her Eliquis.  Return precautions discussed.  Patient is agreeable with the plan.  Discharged in good condition.      ED Course:  ED Course as of 06/18/25 1041   Sun Charles 15, 2025   8767 I reviewed the patient's ultrasound report with her at the bedside and explained that the ultrasound had " some technical challenges however it appears that her DVT is chronic she is on the right medication the Eliquis and she was advised to follow-up closely with her primary provider who manages her Eliquis for further management of her blood clot also suggested consideration of vascular medicine for DVT follow-up as well.  Patient agreeable with the plan of care. [SG]      ED Course User Index  [SG] Cyrstal Garcia MD         Diagnoses as of 06/18/25 1041   Leg wound, right, initial encounter   Chronic deep vein thrombosis (DVT) of right lower extremity, unspecified vein       Independent Result Review and Interpretation: Relevant laboratory and radiographic results were reviewed and independently interpreted by myself.  As necessary, they are commented on in the ED Course.    Social Determinants Limiting Care:  None identified      Patient seen by and discussed with the attending emergency medicine physician.       Disposition    Discharge    Edy Tatum DO   Emergency Medicine PGY-3  [facility]      Procedures      Procedures ? SmartLinks last updated 6/18/2025 10:41 AM          Edy Tatum DO  Resident  06/18/25 1041

## 2025-07-22 ENCOUNTER — APPOINTMENT (OUTPATIENT)
Dept: RADIOLOGY | Facility: HOSPITAL | Age: 65
End: 2025-07-22
Payer: COMMERCIAL

## 2025-07-22 ENCOUNTER — HOSPITAL ENCOUNTER (EMERGENCY)
Facility: HOSPITAL | Age: 65
Discharge: HOME | End: 2025-07-22
Attending: EMERGENCY MEDICINE
Payer: COMMERCIAL

## 2025-07-22 ENCOUNTER — CLINICAL SUPPORT (OUTPATIENT)
Dept: EMERGENCY MEDICINE | Facility: HOSPITAL | Age: 65
End: 2025-07-22
Payer: COMMERCIAL

## 2025-07-22 VITALS
SYSTOLIC BLOOD PRESSURE: 179 MMHG | WEIGHT: 124 LBS | BODY MASS INDEX: 21.97 KG/M2 | HEIGHT: 63 IN | DIASTOLIC BLOOD PRESSURE: 102 MMHG | HEART RATE: 57 BPM | RESPIRATION RATE: 17 BRPM | OXYGEN SATURATION: 99 % | TEMPERATURE: 98.9 F

## 2025-07-22 DIAGNOSIS — M25.512 CHRONIC LEFT SHOULDER PAIN: ICD-10-CM

## 2025-07-22 DIAGNOSIS — G89.29 CHRONIC LEFT SHOULDER PAIN: ICD-10-CM

## 2025-07-22 DIAGNOSIS — R00.1 BRADYCARDIA: Primary | ICD-10-CM

## 2025-07-22 LAB
ALBUMIN SERPL BCP-MCNC: 4.9 G/DL (ref 3.4–5)
ALP SERPL-CCNC: 57 U/L (ref 33–136)
ALT SERPL W P-5'-P-CCNC: 24 U/L (ref 7–45)
ANION GAP SERPL CALC-SCNC: 13 MMOL/L (ref 10–20)
APTT PPP: 33 SECONDS (ref 26–36)
AST SERPL W P-5'-P-CCNC: 43 U/L (ref 9–39)
BASOPHILS # BLD AUTO: 0.04 X10*3/UL (ref 0–0.1)
BASOPHILS NFR BLD AUTO: 0.9 %
BILIRUB SERPL-MCNC: 0.3 MG/DL (ref 0–1.2)
BUN SERPL-MCNC: 19 MG/DL (ref 6–23)
CALCIUM SERPL-MCNC: 10.3 MG/DL (ref 8.6–10.6)
CARDIAC TROPONIN I PNL SERPL HS: 6 NG/L (ref 0–34)
CARDIAC TROPONIN I PNL SERPL HS: 6 NG/L (ref 0–34)
CHLORIDE SERPL-SCNC: 103 MMOL/L (ref 98–107)
CO2 SERPL-SCNC: 28 MMOL/L (ref 21–32)
CREAT SERPL-MCNC: 0.93 MG/DL (ref 0.5–1.05)
EGFRCR SERPLBLD CKD-EPI 2021: 68 ML/MIN/1.73M*2
EOSINOPHIL # BLD AUTO: 0.08 X10*3/UL (ref 0–0.7)
EOSINOPHIL NFR BLD AUTO: 1.7 %
ERYTHROCYTE [DISTWIDTH] IN BLOOD BY AUTOMATED COUNT: 14.3 % (ref 11.5–14.5)
FLUAV RNA RESP QL NAA+PROBE: NOT DETECTED
FLUBV RNA RESP QL NAA+PROBE: NOT DETECTED
GLUCOSE SERPL-MCNC: 78 MG/DL (ref 74–99)
HCT VFR BLD AUTO: 36.5 % (ref 36–46)
HGB BLD-MCNC: 11.6 G/DL (ref 12–16)
IMM GRANULOCYTES # BLD AUTO: 0 X10*3/UL (ref 0–0.7)
IMM GRANULOCYTES NFR BLD AUTO: 0 % (ref 0–0.9)
INR PPP: 1.1 (ref 0.9–1.1)
LYMPHOCYTES # BLD AUTO: 1.8 X10*3/UL (ref 1.2–4.8)
LYMPHOCYTES NFR BLD AUTO: 38.5 %
MCH RBC QN AUTO: 27.5 PG (ref 26–34)
MCHC RBC AUTO-ENTMCNC: 31.8 G/DL (ref 32–36)
MCV RBC AUTO: 87 FL (ref 80–100)
MONOCYTES # BLD AUTO: 0.27 X10*3/UL (ref 0.1–1)
MONOCYTES NFR BLD AUTO: 5.8 %
NEUTROPHILS # BLD AUTO: 2.48 X10*3/UL (ref 1.2–7.7)
NEUTROPHILS NFR BLD AUTO: 53.1 %
NRBC BLD-RTO: 0 /100 WBCS (ref 0–0)
PLATELET # BLD AUTO: 423 X10*3/UL (ref 150–450)
POTASSIUM SERPL-SCNC: 3.5 MMOL/L (ref 3.5–5.3)
PROT SERPL-MCNC: 8.5 G/DL (ref 6.4–8.2)
PROTHROMBIN TIME: 11.8 SECONDS (ref 9.8–12.4)
RBC # BLD AUTO: 4.22 X10*6/UL (ref 4–5.2)
RSV RNA RESP QL NAA+PROBE: NOT DETECTED
SARS-COV-2 RNA RESP QL NAA+PROBE: NOT DETECTED
SODIUM SERPL-SCNC: 140 MMOL/L (ref 136–145)
WBC # BLD AUTO: 4.7 X10*3/UL (ref 4.4–11.3)

## 2025-07-22 PROCEDURE — 84484 ASSAY OF TROPONIN QUANT: CPT | Performed by: EMERGENCY MEDICINE

## 2025-07-22 PROCEDURE — 71046 X-RAY EXAM CHEST 2 VIEWS: CPT

## 2025-07-22 PROCEDURE — 71046 X-RAY EXAM CHEST 2 VIEWS: CPT | Mod: FOREIGN READ | Performed by: RADIOLOGY

## 2025-07-22 PROCEDURE — 99285 EMERGENCY DEPT VISIT HI MDM: CPT | Mod: 25 | Performed by: EMERGENCY MEDICINE

## 2025-07-22 PROCEDURE — 87637 SARSCOV2&INF A&B&RSV AMP PRB: CPT

## 2025-07-22 PROCEDURE — 93005 ELECTROCARDIOGRAM TRACING: CPT

## 2025-07-22 PROCEDURE — 85610 PROTHROMBIN TIME: CPT | Performed by: EMERGENCY MEDICINE

## 2025-07-22 PROCEDURE — 36415 COLL VENOUS BLD VENIPUNCTURE: CPT | Performed by: EMERGENCY MEDICINE

## 2025-07-22 PROCEDURE — 84075 ASSAY ALKALINE PHOSPHATASE: CPT | Performed by: EMERGENCY MEDICINE

## 2025-07-22 PROCEDURE — 2500000001 HC RX 250 WO HCPCS SELF ADMINISTERED DRUGS (ALT 637 FOR MEDICARE OP)

## 2025-07-22 PROCEDURE — 85025 COMPLETE CBC W/AUTO DIFF WBC: CPT | Performed by: EMERGENCY MEDICINE

## 2025-07-22 PROCEDURE — 85730 THROMBOPLASTIN TIME PARTIAL: CPT | Performed by: EMERGENCY MEDICINE

## 2025-07-22 PROCEDURE — 99285 EMERGENCY DEPT VISIT HI MDM: CPT | Performed by: EMERGENCY MEDICINE

## 2025-07-22 PROCEDURE — 80053 COMPREHEN METABOLIC PANEL: CPT | Performed by: EMERGENCY MEDICINE

## 2025-07-22 RX ORDER — IBUPROFEN 600 MG/1
600 TABLET, FILM COATED ORAL EVERY 6 HOURS PRN
Qty: 30 TABLET | Refills: 0 | Status: SHIPPED | OUTPATIENT
Start: 2025-07-22 | End: 2025-08-21

## 2025-07-22 RX ORDER — LISINOPRIL 20 MG/1
20 TABLET ORAL ONCE
Status: COMPLETED | OUTPATIENT
Start: 2025-07-22 | End: 2025-07-22

## 2025-07-22 RX ADMIN — LISINOPRIL 20 MG: 20 TABLET ORAL at 18:11

## 2025-07-22 ASSESSMENT — PAIN SCALES - GENERAL
PAINLEVEL_OUTOF10: 3
PAINLEVEL_OUTOF10: 0 - NO PAIN
PAINLEVEL_OUTOF10: 2

## 2025-07-22 ASSESSMENT — LIFESTYLE VARIABLES
HAVE YOU EVER FELT YOU SHOULD CUT DOWN ON YOUR DRINKING: NO
EVER FELT BAD OR GUILTY ABOUT YOUR DRINKING: NO
EVER HAD A DRINK FIRST THING IN THE MORNING TO STEADY YOUR NERVES TO GET RID OF A HANGOVER: NO

## 2025-07-22 ASSESSMENT — PAIN DESCRIPTION - PAIN TYPE: TYPE: ACUTE PAIN

## 2025-07-22 ASSESSMENT — PAIN DESCRIPTION - LOCATION: LOCATION: CHEST

## 2025-07-22 ASSESSMENT — PAIN DESCRIPTION - DESCRIPTORS: DESCRIPTORS: ACHING

## 2025-07-22 ASSESSMENT — PAIN - FUNCTIONAL ASSESSMENT: PAIN_FUNCTIONAL_ASSESSMENT: 0-10

## 2025-07-22 NOTE — ED PROVIDER NOTES
Emergency Department Provider Note        History of Present Illness     History provided by: Patient  Limitations to History: None  External Records Reviewed with Brief Summary: None    HPI:  Fatimah Bullard is a 65 y.o. female with past medical history of hypertension, hyperlipidemia, GERD, and chronic DVT on Eliquis who presents for bradycardia and chest pain.  According to patient, she was at physical therapy on Friday when her physical therapist noticed that her heart rate was low.  Patient states she was open she has been experiencing intermittent chest pain for few months but that does not radiate along with a low pulse.  Patient states she has been compliant with with her Eliquis as well.  She denies any nausea or vomiting.  She denies shortness of breath, abdominal pain.  She does endorse some coughing over the last few days.  Patient states that her chest pain is not triggered by physical activity and is not reproducible.    Physical Exam   Triage vitals:  T 36.4 °C (97.5 °F)  HR 66  /77  RR 18  O2 98 % None (Room air)    General: Awake, alert, in no acute distress  Eyes: Gaze conjugate.  No scleral icterus or injection  HENT: Normo-cephalic, atraumatic. No stridor  CV: Regular rate, regular rhythm. Radial pulses 2+ bilaterally  Resp: Breathing non-labored, speaking in full sentences.  Clear to auscultation bilaterally  GI: Soft, non-distended, non-tender. No rebound or guarding.  MSK/Extremities: No gross bony deformities. Moving all extremities  Skin: Warm. Appropriate color  Neuro: Alert. Oriented. Face symmetric. Speech is fluent.  Gross strength and sensation intact in b/l UE and LEs  Psych: Appropriate mood and affect    Medical Decision Making & ED Course   Medical Decision Makin y.o. female with a history of hypertension hyperlipidemia chronic DVT presenting for chest pain and bradycardia.  Differential diagnosis includes but not limited to PE, ACS, acute heart failure  exacerbation, arrhythmias, upper respiratory infection, pneumonia.  Labs were ordered including troponin, coagulation panel, respiratory panel.  EKG and chest x-ray were also ordered for concerns of arrhythmia and potential pneumonia.    Labs not show any abnormalities.  Troponin was normal.  Coagulation panel was normal.  Chest x-ray is reviewed did not show any acute cardiopulmonary pathologies ruling out pneumonia.  EKG showed sinus bradycardia but did not show any ST elevations/depressions or T wave inversions.    Patient is stable and her workup is unremarkable.  Discussed patient that she should seek follow-up with her cardiologist for bradycardia.  Patient is agreeable to plan and is ready for discharge.            Differential diagnoses considered include but are not limited to: See MDM above     Social Determinants of Health which Significantly Impact Care: None identified     EKG Independent Interpretation: EKG interpreted by myself. Please see ED Course for full interpretation.    Independent Result Review and Interpretation: Relevant laboratory and radiographic results were reviewed and independently interpreted by myself.  As necessary, they are commented on in the ED Course.    Chronic conditions affecting the patient's care: As documented above in [facility]    The patient was discussed with the following consultants/services: None    Care Considerations: As documented above in [facility]    ED Course:  ED Course as of 07/22/25 1814   Tue Jul 22, 2025   1544 EKG was independently interpreted: Rate about 50 bpm.  Sinus bradycardia.  Normal axis.  No ST elevations or depressions noted.  T wave inversions present in anterior leads, present in prior EKG back in 2025.  No new changes from prior EKGs. []   1628 Chest x-ray independently interpreted: Trachea appears midline.  No bony fractures or rib fractures noted.  Cardiac silhouette does not appear enlarged.  Costophrenic angles sharp, no effusions noted.  Potential  consolidation of mid upper lobe left lung. []   1653 Final read of chest x-ray was clear.  No acute cardiopulmonary pathology. []   1812 Supervising resident attestation:  Patient is a 65-year-old female with a past medical history of  hypertension, hyperlipidemia, GERD, DVT on Eliquis, ulcerative colitis, glaucoma, and depression who presented to the ED for chest pain.  Notes that her chest pain is secondary to having deep coughs.  Notes that she has been having cough and congestion over the past week.  Her main concern is that she was bradycardic during her PT appointment this past Friday.  Patient HDS.  Low clinical concern for PE given compliance with Eliquis.  Equal pulses of the upper and lower extremities not concerning for dissection.  Equal bilateral breath sounds with low clinical concern for pneumothorax.  Patient to undergo cardiac workup.  Plan for outpatient cardiology if workup is unremarkable.    Independently seen and evaluated.  In agreement with resident/VANE plan. [MH]   1813 Patient elevated blood pressure in the ED.  She denies any symptoms at this time.  She says she missed her home dose of lisinopril 20 mg.  She was given her home dose here in the ED. []      ED Course User Index  [MH] Stoney Garcia MD  [] Andrea Hernández DO         Diagnoses as of 07/22/25 1814   Bradycardia   Chronic left shoulder pain     Disposition   Discharge    Procedures       Patient seen and discussed with ED attending physician.    Andrea Hernández DO  Emergency Medicine     Andrea Hernández DO  Resident  07/22/25 1814

## 2025-07-23 LAB
ATRIAL RATE: 53 BPM
P AXIS: 44 DEGREES
P OFFSET: 198 MS
P ONSET: 167 MS
PR INTERVAL: 112 MS
Q ONSET: 223 MS
QRS COUNT: 9 BEATS
QRS DURATION: 88 MS
QT INTERVAL: 428 MS
QTC CALCULATION(BAZETT): 401 MS
QTC FREDERICIA: 410 MS
R AXIS: 44 DEGREES
T AXIS: 228 DEGREES
T OFFSET: 437 MS
VENTRICULAR RATE: 53 BPM

## 2025-08-11 PROBLEM — H90.3 SENSORINEURAL HEARING LOSS (SNHL) OF BOTH EARS: Status: ACTIVE | Noted: 2025-08-11

## 2025-08-11 PROBLEM — S69.90XA INJURY OF LITTLE FINGER: Status: RESOLVED | Noted: 2023-08-15 | Resolved: 2025-08-11

## 2025-08-11 PROBLEM — D50.9 IRON DEFICIENCY ANEMIA: Status: ACTIVE | Noted: 2024-03-25

## 2025-08-11 PROBLEM — I10 HYPERTENSION: Status: RESOLVED | Noted: 2023-05-22 | Resolved: 2025-08-11

## 2025-08-11 PROBLEM — K59.04 CHRONIC IDIOPATHIC CONSTIPATION: Status: ACTIVE | Noted: 2024-03-25

## 2025-08-25 ENCOUNTER — APPOINTMENT (OUTPATIENT)
Dept: CARDIOLOGY | Facility: HOSPITAL | Age: 65
End: 2025-08-25
Payer: COMMERCIAL

## 2025-09-02 ENCOUNTER — OFFICE VISIT (OUTPATIENT)
Dept: CARDIOLOGY | Facility: HOSPITAL | Age: 65
End: 2025-09-02
Payer: COMMERCIAL

## 2025-09-02 VITALS
HEIGHT: 64 IN | BODY MASS INDEX: 20.62 KG/M2 | OXYGEN SATURATION: 100 % | HEART RATE: 65 BPM | WEIGHT: 120.8 LBS | SYSTOLIC BLOOD PRESSURE: 148 MMHG | DIASTOLIC BLOOD PRESSURE: 76 MMHG

## 2025-09-02 DIAGNOSIS — I10 HYPERTENSION, UNSPECIFIED TYPE: ICD-10-CM

## 2025-09-02 DIAGNOSIS — E78.2 MODERATE MIXED HYPERLIPIDEMIA NOT REQUIRING STATIN THERAPY: ICD-10-CM

## 2025-09-02 DIAGNOSIS — R00.1 SINUS BRADYCARDIA: Primary | ICD-10-CM

## 2025-09-02 PROBLEM — I82.4Y1 DVT, LOWER EXTREMITY, PROXIMAL, ACUTE, RIGHT: Status: RESOLVED | Noted: 2022-12-29 | Resolved: 2025-09-02

## 2025-09-02 PROCEDURE — 99214 OFFICE O/P EST MOD 30 MIN: CPT | Performed by: STUDENT IN AN ORGANIZED HEALTH CARE EDUCATION/TRAINING PROGRAM

## 2025-09-02 PROCEDURE — 1159F MED LIST DOCD IN RCRD: CPT | Performed by: STUDENT IN AN ORGANIZED HEALTH CARE EDUCATION/TRAINING PROGRAM

## 2025-09-02 PROCEDURE — 1126F AMNT PAIN NOTED NONE PRSNT: CPT | Performed by: STUDENT IN AN ORGANIZED HEALTH CARE EDUCATION/TRAINING PROGRAM

## 2025-09-02 PROCEDURE — 3078F DIAST BP <80 MM HG: CPT | Performed by: STUDENT IN AN ORGANIZED HEALTH CARE EDUCATION/TRAINING PROGRAM

## 2025-09-02 PROCEDURE — 93005 ELECTROCARDIOGRAM TRACING: CPT | Performed by: STUDENT IN AN ORGANIZED HEALTH CARE EDUCATION/TRAINING PROGRAM

## 2025-09-02 PROCEDURE — 3008F BODY MASS INDEX DOCD: CPT | Performed by: STUDENT IN AN ORGANIZED HEALTH CARE EDUCATION/TRAINING PROGRAM

## 2025-09-02 PROCEDURE — 99212 OFFICE O/P EST SF 10 MIN: CPT

## 2025-09-02 PROCEDURE — 93010 ELECTROCARDIOGRAM REPORT: CPT | Performed by: INTERNAL MEDICINE

## 2025-09-02 PROCEDURE — 1036F TOBACCO NON-USER: CPT | Performed by: STUDENT IN AN ORGANIZED HEALTH CARE EDUCATION/TRAINING PROGRAM

## 2025-09-02 PROCEDURE — 3077F SYST BP >= 140 MM HG: CPT | Performed by: STUDENT IN AN ORGANIZED HEALTH CARE EDUCATION/TRAINING PROGRAM

## 2025-09-02 RX ORDER — AMLODIPINE BESYLATE 5 MG/1
5 TABLET ORAL DAILY
Qty: 90 TABLET | Refills: 2 | Status: SHIPPED | OUTPATIENT
Start: 2025-09-02 | End: 2026-09-02

## 2025-09-02 RX ORDER — SIMVASTATIN 40 MG/1
40 TABLET, FILM COATED ORAL NIGHTLY
Qty: 90 TABLET | Refills: 3 | Status: SHIPPED | OUTPATIENT
Start: 2025-09-02 | End: 2034-07-15

## 2025-09-02 RX ORDER — LISINOPRIL 20 MG/1
20 TABLET ORAL DAILY
Qty: 90 TABLET | Refills: 3 | Status: SHIPPED | OUTPATIENT
Start: 2025-09-02 | End: 2026-09-02

## 2025-09-02 ASSESSMENT — PAIN SCALES - GENERAL: PAINLEVEL_OUTOF10: 0-NO PAIN

## 2025-09-04 LAB
ATRIAL RATE: 64 BPM
P AXIS: 60 DEGREES
P OFFSET: 208 MS
P ONSET: 146 MS
PR INTERVAL: 152 MS
Q ONSET: 222 MS
QRS COUNT: 11 BEATS
QRS DURATION: 96 MS
QT INTERVAL: 370 MS
QTC CALCULATION(BAZETT): 381 MS
QTC FREDERICIA: 378 MS
R AXIS: 24 DEGREES
T AXIS: -19 DEGREES
T OFFSET: 407 MS
VENTRICULAR RATE: 64 BPM